# Patient Record
Sex: MALE | Race: WHITE | NOT HISPANIC OR LATINO | URBAN - METROPOLITAN AREA
[De-identification: names, ages, dates, MRNs, and addresses within clinical notes are randomized per-mention and may not be internally consistent; named-entity substitution may affect disease eponyms.]

---

## 2017-03-02 ENCOUNTER — OUTPATIENT (OUTPATIENT)
Dept: OUTPATIENT SERVICES | Facility: HOSPITAL | Age: 77
LOS: 1 days | End: 2017-03-02
Payer: MEDICARE

## 2017-03-02 PROCEDURE — 73502 X-RAY EXAM HIP UNI 2-3 VIEWS: CPT

## 2017-03-02 PROCEDURE — 73502 X-RAY EXAM HIP UNI 2-3 VIEWS: CPT | Mod: 26,LT

## 2019-01-16 ENCOUNTER — OUTPATIENT (OUTPATIENT)
Dept: OUTPATIENT SERVICES | Facility: HOSPITAL | Age: 79
LOS: 1 days | End: 2019-01-16
Payer: COMMERCIAL

## 2019-01-16 LAB
MRSA PCR RESULT.: NEGATIVE — SIGNIFICANT CHANGE UP
S AUREUS DNA NOSE QL NAA+PROBE: NEGATIVE — SIGNIFICANT CHANGE UP

## 2019-01-16 PROCEDURE — 87641 MR-STAPH DNA AMP PROBE: CPT

## 2019-01-29 ENCOUNTER — OUTPATIENT (OUTPATIENT)
Dept: OUTPATIENT SERVICES | Facility: HOSPITAL | Age: 79
LOS: 1 days | End: 2019-01-29
Payer: MEDICARE

## 2019-01-29 PROCEDURE — 73502 X-RAY EXAM HIP UNI 2-3 VIEWS: CPT | Mod: 26,LT

## 2019-01-31 VITALS
HEART RATE: 69 BPM | RESPIRATION RATE: 16 BRPM | HEIGHT: 71 IN | WEIGHT: 167.99 LBS | DIASTOLIC BLOOD PRESSURE: 74 MMHG | SYSTOLIC BLOOD PRESSURE: 123 MMHG | TEMPERATURE: 98 F | OXYGEN SATURATION: 97 %

## 2019-01-31 NOTE — H&P ADULT - NSHPLABSRESULTS_GEN_ALL_CORE
Preop CMP/CBC/PT/INR/PTT - WNL, reviewed by medical clearance  Preop UA - ordered for day of surgery   EKG - WNL reviewed by medical clearance   Nares swab - negative for MRSA/MSSA  Preop CXR - no acute pulmonary pathology; new linear metallic density in the left lung, likely an embolized radioactive prostate seed - reviewed by medical clearance Preop CMP/CBC/PT/INR/PTT - WNL, reviewed by medical clearance  Preop UA - neg  EKG - WNL reviewed by medical clearance   Nares swab - negative for MRSA/MSSA  Preop CXR - no acute pulmonary pathology; new linear metallic density in the left lung, likely an embolized radioactive prostate seed - reviewed by medical clearance

## 2019-01-31 NOTE — H&P ADULT - PMH
Hernia    Meniscus degeneration, right    Prostate cancer Broken shoulder, left, closed, initial encounter  greater tuberosity fx  Hernia    High cholesterol    HTN (hypertension)    Meniscus degeneration, right    Prostate cancer  seeds implants

## 2019-01-31 NOTE — H&P ADULT - HISTORY OF PRESENT ILLNESS
78 year old male presents with left hip pain x  Presents today for elective left total hip replacement 78 year old male presents with left hip pain worsened over time without improvement. Failed conservative treatments. Denies numbness, tingling. Ambulates without assist. Presents today for elective left THR, removal of HO, rectus femoris repair.

## 2019-01-31 NOTE — H&P ADULT - PROBLEM SELECTOR PLAN 1
Admit to Orthopaedic Service.  Presents today for elective  left total hip replacement   Pt medically stable and cleared for procedure today by Dr. Hdz Admit to Orthopaedic Service.  Presents today for elective left THR, removal of HO, rectus femoris repair.  Pt cleared for procedure today by Dr. Hdz

## 2019-01-31 NOTE — H&P ADULT - NSHPPHYSICALEXAM_GEN_ALL_CORE
MSK: decreased range of motion left hip secondary to pain  Remainder of physical exam as per medical clearance note Left LE: Hip decreased ROM secondary to pain, sensation intact, DP 2+, brisk cap refill, EHL/FHL/TA/GS 5/5  Rest of PE per MD clearance

## 2019-02-01 ENCOUNTER — INPATIENT (INPATIENT)
Facility: HOSPITAL | Age: 79
LOS: 0 days | Discharge: HOME HEALTH | DRG: 470 | End: 2019-02-02
Attending: STUDENT IN AN ORGANIZED HEALTH CARE EDUCATION/TRAINING PROGRAM | Admitting: STUDENT IN AN ORGANIZED HEALTH CARE EDUCATION/TRAINING PROGRAM
Payer: MEDICARE

## 2019-02-01 DIAGNOSIS — M16.12 UNILATERAL PRIMARY OSTEOARTHRITIS, LEFT HIP: ICD-10-CM

## 2019-02-01 DIAGNOSIS — Z41.9 ENCOUNTER FOR PROCEDURE FOR PURPOSES OTHER THAN REMEDYING HEALTH STATE, UNSPECIFIED: Chronic | ICD-10-CM

## 2019-02-01 DIAGNOSIS — S42.92XA FRACTURE OF LEFT SHOULDER GIRDLE, PART UNSPECIFIED, INITIAL ENCOUNTER FOR CLOSED FRACTURE: ICD-10-CM

## 2019-02-01 DIAGNOSIS — C61 MALIGNANT NEOPLASM OF PROSTATE: ICD-10-CM

## 2019-02-01 DIAGNOSIS — Z98.890 OTHER SPECIFIED POSTPROCEDURAL STATES: Chronic | ICD-10-CM

## 2019-02-01 DIAGNOSIS — I10 ESSENTIAL (PRIMARY) HYPERTENSION: ICD-10-CM

## 2019-02-01 DIAGNOSIS — Z90.49 ACQUIRED ABSENCE OF OTHER SPECIFIED PARTS OF DIGESTIVE TRACT: Chronic | ICD-10-CM

## 2019-02-01 LAB
APPEARANCE UR: CLEAR — SIGNIFICANT CHANGE UP
BILIRUB UR-MCNC: NEGATIVE — SIGNIFICANT CHANGE UP
COLOR SPEC: YELLOW — SIGNIFICANT CHANGE UP
DIFF PNL FLD: NEGATIVE — SIGNIFICANT CHANGE UP
GLUCOSE UR QL: NEGATIVE — SIGNIFICANT CHANGE UP
KETONES UR-MCNC: NEGATIVE — SIGNIFICANT CHANGE UP
LEUKOCYTE ESTERASE UR-ACNC: NEGATIVE — SIGNIFICANT CHANGE UP
NITRITE UR-MCNC: NEGATIVE — SIGNIFICANT CHANGE UP
PH UR: 5.5 — SIGNIFICANT CHANGE UP (ref 5–8)
PROT UR-MCNC: NEGATIVE MG/DL — SIGNIFICANT CHANGE UP
SP GR SPEC: >=1.03 — SIGNIFICANT CHANGE UP (ref 1–1.03)
UROBILINOGEN FLD QL: 0.2 E.U./DL — SIGNIFICANT CHANGE UP

## 2019-02-01 PROCEDURE — 72170 X-RAY EXAM OF PELVIS: CPT | Mod: 26

## 2019-02-01 RX ORDER — ONDANSETRON 8 MG/1
4 TABLET, FILM COATED ORAL EVERY 6 HOURS
Qty: 0 | Refills: 0 | Status: DISCONTINUED | OUTPATIENT
Start: 2019-02-01 | End: 2019-02-02

## 2019-02-01 RX ORDER — OXYCODONE HYDROCHLORIDE 5 MG/1
20 TABLET ORAL ONCE
Qty: 0 | Refills: 0 | Status: DISCONTINUED | OUTPATIENT
Start: 2019-02-01 | End: 2019-02-01

## 2019-02-01 RX ORDER — DOCUSATE SODIUM 100 MG
100 CAPSULE ORAL THREE TIMES A DAY
Qty: 0 | Refills: 0 | Status: DISCONTINUED | OUTPATIENT
Start: 2019-02-01 | End: 2019-02-02

## 2019-02-01 RX ORDER — CEFAZOLIN SODIUM 1 G
2000 VIAL (EA) INJECTION EVERY 8 HOURS
Qty: 0 | Refills: 0 | Status: COMPLETED | OUTPATIENT
Start: 2019-02-01 | End: 2019-02-02

## 2019-02-01 RX ORDER — INDOMETHACIN 50 MG
75 CAPSULE ORAL
Qty: 0 | Refills: 0 | Status: DISCONTINUED | OUTPATIENT
Start: 2019-02-01 | End: 2019-02-02

## 2019-02-01 RX ORDER — HYDROMORPHONE HYDROCHLORIDE 2 MG/ML
0.5 INJECTION INTRAMUSCULAR; INTRAVENOUS; SUBCUTANEOUS
Qty: 0 | Refills: 0 | Status: DISCONTINUED | OUTPATIENT
Start: 2019-02-01 | End: 2019-02-02

## 2019-02-01 RX ORDER — CELECOXIB 200 MG/1
200 CAPSULE ORAL DAILY
Qty: 0 | Refills: 0 | Status: DISCONTINUED | OUTPATIENT
Start: 2019-02-01 | End: 2019-02-02

## 2019-02-01 RX ORDER — MAGNESIUM HYDROXIDE 400 MG/1
30 TABLET, CHEWABLE ORAL AT BEDTIME
Qty: 0 | Refills: 0 | Status: DISCONTINUED | OUTPATIENT
Start: 2019-02-01 | End: 2019-02-02

## 2019-02-01 RX ORDER — ASPIRIN/CALCIUM CARB/MAGNESIUM 324 MG
325 TABLET ORAL
Qty: 0 | Refills: 0 | Status: DISCONTINUED | OUTPATIENT
Start: 2019-02-01 | End: 2019-02-02

## 2019-02-01 RX ORDER — POLYETHYLENE GLYCOL 3350 17 G/17G
17 POWDER, FOR SOLUTION ORAL DAILY
Qty: 0 | Refills: 0 | Status: DISCONTINUED | OUTPATIENT
Start: 2019-02-01 | End: 2019-02-02

## 2019-02-01 RX ORDER — BUPIVACAINE 13.3 MG/ML
20 INJECTION, SUSPENSION, LIPOSOMAL INFILTRATION ONCE
Qty: 0 | Refills: 0 | Status: DISCONTINUED | OUTPATIENT
Start: 2019-02-01 | End: 2019-02-02

## 2019-02-01 RX ORDER — SODIUM CHLORIDE 9 MG/ML
1000 INJECTION, SOLUTION INTRAVENOUS
Qty: 0 | Refills: 0 | Status: DISCONTINUED | OUTPATIENT
Start: 2019-02-01 | End: 2019-02-02

## 2019-02-01 RX ORDER — OXYCODONE HYDROCHLORIDE 5 MG/1
10 TABLET ORAL EVERY 4 HOURS
Qty: 0 | Refills: 0 | Status: DISCONTINUED | OUTPATIENT
Start: 2019-02-01 | End: 2019-02-02

## 2019-02-01 RX ORDER — SENNA PLUS 8.6 MG/1
2 TABLET ORAL AT BEDTIME
Qty: 0 | Refills: 0 | Status: DISCONTINUED | OUTPATIENT
Start: 2019-02-01 | End: 2019-02-02

## 2019-02-01 RX ORDER — ATORVASTATIN CALCIUM 80 MG/1
10 TABLET, FILM COATED ORAL AT BEDTIME
Qty: 0 | Refills: 0 | Status: DISCONTINUED | OUTPATIENT
Start: 2019-02-01 | End: 2019-02-02

## 2019-02-01 RX ORDER — CEFAZOLIN SODIUM 1 G
2000 VIAL (EA) INJECTION EVERY 8 HOURS
Qty: 0 | Refills: 0 | Status: DISCONTINUED | OUTPATIENT
Start: 2019-02-01 | End: 2019-02-01

## 2019-02-01 RX ORDER — ACETAMINOPHEN 500 MG
650 TABLET ORAL EVERY 6 HOURS
Qty: 0 | Refills: 0 | Status: DISCONTINUED | OUTPATIENT
Start: 2019-02-01 | End: 2019-02-02

## 2019-02-01 RX ORDER — HYDROMORPHONE HYDROCHLORIDE 2 MG/ML
0.5 INJECTION INTRAMUSCULAR; INTRAVENOUS; SUBCUTANEOUS EVERY 4 HOURS
Qty: 0 | Refills: 0 | Status: DISCONTINUED | OUTPATIENT
Start: 2019-02-01 | End: 2019-02-02

## 2019-02-01 RX ORDER — CELECOXIB 200 MG/1
400 CAPSULE ORAL ONCE
Qty: 0 | Refills: 0 | Status: COMPLETED | OUTPATIENT
Start: 2019-02-01 | End: 2019-02-01

## 2019-02-01 RX ORDER — METOPROLOL TARTRATE 50 MG
25 TABLET ORAL DAILY
Qty: 0 | Refills: 0 | Status: DISCONTINUED | OUTPATIENT
Start: 2019-02-01 | End: 2019-02-02

## 2019-02-01 RX ORDER — OXYCODONE HYDROCHLORIDE 5 MG/1
5 TABLET ORAL EVERY 4 HOURS
Qty: 0 | Refills: 0 | Status: DISCONTINUED | OUTPATIENT
Start: 2019-02-01 | End: 2019-02-02

## 2019-02-01 RX ADMIN — Medication 650 MILLIGRAM(S): at 17:53

## 2019-02-01 RX ADMIN — ATORVASTATIN CALCIUM 10 MILLIGRAM(S): 80 TABLET, FILM COATED ORAL at 21:25

## 2019-02-01 RX ADMIN — Medication 650 MILLIGRAM(S): at 18:24

## 2019-02-01 RX ADMIN — OXYCODONE HYDROCHLORIDE 20 MILLIGRAM(S): 5 TABLET ORAL at 07:24

## 2019-02-01 RX ADMIN — Medication 2000 MILLIGRAM(S): at 16:50

## 2019-02-01 RX ADMIN — CELECOXIB 400 MILLIGRAM(S): 200 CAPSULE ORAL at 07:24

## 2019-02-01 RX ADMIN — HYDROMORPHONE HYDROCHLORIDE 0.5 MILLIGRAM(S): 2 INJECTION INTRAMUSCULAR; INTRAVENOUS; SUBCUTANEOUS at 15:29

## 2019-02-01 RX ADMIN — SODIUM CHLORIDE 125 MILLILITER(S): 9 INJECTION, SOLUTION INTRAVENOUS at 16:50

## 2019-02-01 RX ADMIN — Medication 75 MILLIGRAM(S): at 17:53

## 2019-02-01 RX ADMIN — Medication 100 MILLIGRAM(S): at 21:25

## 2019-02-01 RX ADMIN — Medication 75 MILLIGRAM(S): at 18:24

## 2019-02-01 RX ADMIN — Medication 325 MILLIGRAM(S): at 17:52

## 2019-02-01 RX ADMIN — HYDROMORPHONE HYDROCHLORIDE 0.5 MILLIGRAM(S): 2 INJECTION INTRAMUSCULAR; INTRAVENOUS; SUBCUTANEOUS at 13:57

## 2019-02-01 NOTE — BRIEF OPERATIVE NOTE - PRE-OP DX
Primary osteoarthritis of left hip  02/01/2019  AND HETEROTROPHIC OSSIFICATION LEFT HIP  Active  Mike Mckeon

## 2019-02-01 NOTE — PHYSICAL THERAPY INITIAL EVALUATION ADULT - ADDITIONAL COMMENTS
Pt ind prior to surgery. 2 steps into lobby to elevator. Denies owning any DME. Pt's wife has an aide.

## 2019-02-01 NOTE — PROGRESS NOTE ADULT - SUBJECTIVE AND OBJECTIVE BOX
Orthopedics Post Op Check    Procedure: LEFT THR, REMOVAL OF HO, RECTUS FEMORIS REPAIR  Surgeon: JENNIFER    Pt. stable, laying in bed with muffin in hand. Pt. states he is hungry. Pt. advised not to eat whole foods yet, will advance diet later.   Denies CP, SOB, N/V, numbness/tingling in b/l les.     AVSS  Dressing C/D/I adrianna dressing     Pulses: DP/PT 2+ B/L LES  SLT: intact  B/L LES  Motor:  EHL/FHL/TA/GS  5/5 b/l les          Post op XR: Prosthesis in place    A/P: 78yoMale POD#0 s/p LEFT THR, REMOVAL OF HO, RECTUS FEMORIS REPAIR  - Stable  - Pain Control  - DVT ppx:asa  - Post op abx: ancef  - PT, WBS: wbat b/l les  - F/U AM Labs

## 2019-02-01 NOTE — PROGRESS NOTE ADULT - SUBJECTIVE AND OBJECTIVE BOX
SUBJECTIVE: Patient seen and examined. Pt doing well o/n.  No f/c/n/v/cp/sob.     OBJECTIVE:  Vital Signs Last 24 Hrs  T(C): 36.6 (01 Feb 2019 16:31), Max: 36.6 (01 Feb 2019 16:31)  T(F): 97.8 (01 Feb 2019 16:31), Max: 97.8 (01 Feb 2019 16:31)  HR: 80 (01 Feb 2019 16:31) (70 - 84)  BP: 104/64 (01 Feb 2019 16:31) (104/64 - 128/68)  BP(mean): 88 (01 Feb 2019 15:29) (80 - 93)  RR: 17 (01 Feb 2019 16:31) (10 - 24)  SpO2: 98% (01 Feb 2019 16:31) (94% - 100%)    Affected extremity:          Dressing: clean/dry/intact            Sensation: SILT         Motor exam: 5/5 TA/GS/EHL         warm well perfused; capillary refill <3 seconds     LABS:            Urinalysis Basic - ( 01 Feb 2019 06:51 )    Color: Yellow / Appearance: Clear / SG: >=1.030 / pH: x  Gluc: x / Ketone: NEGATIVE  / Bili: Negative / Urobili: 0.2 E.U./dL   Blood: x / Protein: NEGATIVE mg/dL / Nitrite: NEGATIVE   Leuk Esterase: NEGATIVE / RBC: x / WBC x   Sq Epi: x / Non Sq Epi: x / Bacteria: x      MEDICATIONS:acetaminophen   Tablet .. 650 milliGRAM(s) Oral every 6 hours  aluminum hydroxide/magnesium hydroxide/simethicone Suspension 30 milliLiter(s) Oral four times a day PRN  aspirin enteric coated 325 milliGRAM(s) Oral two times a day  atorvastatin 10 milliGRAM(s) Oral at bedtime  BUpivacaine liposome 1.3% Injectable (no eMAR) 20 milliLiter(s) Local Injection once  ceFAZolin  Injectable. 2000 milliGRAM(s) IV Push every 8 hours  celecoxib 200 milliGRAM(s) Oral daily  docusate sodium 100 milliGRAM(s) Oral three times a day  HYDROmorphone  Injectable 0.5 milliGRAM(s) IV Push every 15 minutes PRN  HYDROmorphone  Injectable 0.5 milliGRAM(s) IV Push every 4 hours PRN  indomethacin 75 milliGRAM(s) Oral two times a day  lactated ringers. 1000 milliLiter(s) IV Continuous <Continuous>  magnesium hydroxide Suspension 30 milliLiter(s) Oral at bedtime PRN  metoprolol tartrate 25 milliGRAM(s) Oral daily  ondansetron Injectable 4 milliGRAM(s) IV Push every 6 hours PRN  oxyCODONE    IR 5 milliGRAM(s) Oral every 4 hours PRN  oxyCODONE    IR 10 milliGRAM(s) Oral every 4 hours PRN  polyethylene glycol 3350 17 Gram(s) Oral daily  senna 2 Tablet(s) Oral at bedtime PRN    Anticoagulation:  aspirin enteric coated 325 milliGRAM(s) Oral two times a day    Antibiotics:   ceFAZolin  Injectable. 2000 milliGRAM(s) IV Push every 8 hours    Pain medications:   acetaminophen   Tablet .. 650 milliGRAM(s) Oral every 6 hours  celecoxib 200 milliGRAM(s) Oral daily  HYDROmorphone  Injectable 0.5 milliGRAM(s) IV Push every 15 minutes PRN  HYDROmorphone  Injectable 0.5 milliGRAM(s) IV Push every 4 hours PRN  indomethacin 75 milliGRAM(s) Oral two times a day  ondansetron Injectable 4 milliGRAM(s) IV Push every 6 hours PRN  oxyCODONE    IR 5 milliGRAM(s) Oral every 4 hours PRN  oxyCODONE    IR 10 milliGRAM(s) Oral every 4 hours PRN    A/P :  Pt is a 77yo Male s/p  POD # Left ROSAMARIA  -    Pain control  -    DVT ppx: ASA     -    Weight bearing status: WBAT , No hyperextension, NO ER more than 20 degrees  -    Physical Therapy  -    Dispo: Home

## 2019-02-01 NOTE — BRIEF OPERATIVE NOTE - PROCEDURE
<<-----Click on this checkbox to enter Procedure Total hip arthroplasty  02/01/2019  LEFT THR, REMOVAL OF H.O., REPAIR OF RECTUS FEMORIS  Active  KMATHIS

## 2019-02-01 NOTE — PROGRESS NOTE ADULT - ASSESSMENT
A/P :  Pt is a 77yo Male s/p  POD # Left ROSAMARIA  -    Pain control  -    DVT ppx: ASA  , Indomethacine for HO prophylaxis   -    Weight bearing status: WBAT , No hyperextension, NO ER more than 20 degrees  -    Physical Therapy  -    Dispo: Home

## 2019-02-02 ENCOUNTER — TRANSCRIPTION ENCOUNTER (OUTPATIENT)
Age: 79
End: 2019-02-02

## 2019-02-02 VITALS
TEMPERATURE: 97 F | HEART RATE: 71 BPM | SYSTOLIC BLOOD PRESSURE: 104 MMHG | OXYGEN SATURATION: 98 % | RESPIRATION RATE: 16 BRPM | DIASTOLIC BLOOD PRESSURE: 57 MMHG

## 2019-02-02 LAB
ANION GAP SERPL CALC-SCNC: 12 MMOL/L — SIGNIFICANT CHANGE UP (ref 5–17)
BUN SERPL-MCNC: 25 MG/DL — HIGH (ref 7–23)
CALCIUM SERPL-MCNC: 8.9 MG/DL — SIGNIFICANT CHANGE UP (ref 8.4–10.5)
CHLORIDE SERPL-SCNC: 105 MMOL/L — SIGNIFICANT CHANGE UP (ref 96–108)
CO2 SERPL-SCNC: 24 MMOL/L — SIGNIFICANT CHANGE UP (ref 22–31)
CREAT SERPL-MCNC: 1.01 MG/DL — SIGNIFICANT CHANGE UP (ref 0.5–1.3)
GLUCOSE SERPL-MCNC: 150 MG/DL — HIGH (ref 70–99)
HCT VFR BLD CALC: 30.9 % — LOW (ref 39–50)
HGB BLD-MCNC: 10 G/DL — LOW (ref 13–17)
MCHC RBC-ENTMCNC: 29.9 PG — SIGNIFICANT CHANGE UP (ref 27–34)
MCHC RBC-ENTMCNC: 32.4 G/DL — SIGNIFICANT CHANGE UP (ref 32–36)
MCV RBC AUTO: 92.5 FL — SIGNIFICANT CHANGE UP (ref 80–100)
PLATELET # BLD AUTO: 177 K/UL — SIGNIFICANT CHANGE UP (ref 150–400)
POTASSIUM SERPL-MCNC: 4.3 MMOL/L — SIGNIFICANT CHANGE UP (ref 3.5–5.3)
POTASSIUM SERPL-SCNC: 4.3 MMOL/L — SIGNIFICANT CHANGE UP (ref 3.5–5.3)
RBC # BLD: 3.34 M/UL — LOW (ref 4.2–5.8)
RBC # FLD: 13 % — SIGNIFICANT CHANGE UP (ref 10.3–16.9)
SODIUM SERPL-SCNC: 141 MMOL/L — SIGNIFICANT CHANGE UP (ref 135–145)
WBC # BLD: 10 K/UL — SIGNIFICANT CHANGE UP (ref 3.8–10.5)
WBC # FLD AUTO: 10 K/UL — SIGNIFICANT CHANGE UP (ref 3.8–10.5)

## 2019-02-02 PROCEDURE — 99231 SBSQ HOSP IP/OBS SF/LOW 25: CPT

## 2019-02-02 RX ORDER — METOPROLOL TARTRATE 50 MG
25 TABLET ORAL DAILY
Qty: 0 | Refills: 0 | Status: DISCONTINUED | OUTPATIENT
Start: 2019-02-02 | End: 2019-02-02

## 2019-02-02 RX ORDER — INDOMETHACIN 50 MG
75 CAPSULE ORAL
Qty: 2250 | Refills: 0
Start: 2019-02-02 | End: 2019-02-16

## 2019-02-02 RX ORDER — SENNA PLUS 8.6 MG/1
2 TABLET ORAL
Qty: 0 | Refills: 0 | DISCHARGE
Start: 2019-02-02

## 2019-02-02 RX ORDER — DOCUSATE SODIUM 100 MG
1 CAPSULE ORAL
Qty: 0 | Refills: 0 | DISCHARGE
Start: 2019-02-02

## 2019-02-02 RX ORDER — ASPIRIN/CALCIUM CARB/MAGNESIUM 324 MG
325 TABLET ORAL
Qty: 30 | Refills: 0
Start: 2019-02-02 | End: 2019-02-16

## 2019-02-02 RX ADMIN — Medication 325 MILLIGRAM(S): at 06:43

## 2019-02-02 RX ADMIN — Medication 100 MILLIGRAM(S): at 06:43

## 2019-02-02 RX ADMIN — Medication 650 MILLIGRAM(S): at 12:00

## 2019-02-02 RX ADMIN — Medication 75 MILLIGRAM(S): at 06:43

## 2019-02-02 RX ADMIN — Medication 650 MILLIGRAM(S): at 00:36

## 2019-02-02 RX ADMIN — SODIUM CHLORIDE 125 MILLILITER(S): 9 INJECTION, SOLUTION INTRAVENOUS at 06:43

## 2019-02-02 RX ADMIN — Medication 650 MILLIGRAM(S): at 11:30

## 2019-02-02 RX ADMIN — Medication 650 MILLIGRAM(S): at 06:43

## 2019-02-02 RX ADMIN — Medication 2000 MILLIGRAM(S): at 00:36

## 2019-02-02 NOTE — DISCHARGE NOTE ADULT - ADDITIONAL INSTRUCTIONS
Please follow up with Dr. Guerrier in 1 week or as scheduled   Please call and schedule an appointment with your primary care physician in 1 week

## 2019-02-02 NOTE — PROGRESS NOTE ADULT - ASSESSMENT
A/P :  Pt is a 77yo Male s/p  POD # 1 Left ROSAMARIA  -    Pain control  -    DVT ppx: ASA     -    Weight bearing status: WBAT, No hyperexetension, No ER more than 20 degrees  -    Physical Therapy  -    Dispo: Home

## 2019-02-02 NOTE — PROGRESS NOTE ADULT - SUBJECTIVE AND OBJECTIVE BOX
SUBJECTIVE: Patient seen and examined. Pt doing well o/n.  No f/c/n/v/cp/sob.     Left ROSAMARIA POD#1    OBJECTIVE:  Vital Signs Last 24 Hrs  T(C): 35.9 (02 Feb 2019 08:30), Max: 36.8 (01 Feb 2019 20:48)  T(F): 96.7 (02 Feb 2019 08:30), Max: 98.2 (01 Feb 2019 20:48)  HR: 71 (02 Feb 2019 08:30) (71 - 98)  BP: 104/57 (02 Feb 2019 08:30) (96/58 - 121/70)  BP(mean): 88 (01 Feb 2019 15:29) (88 - 88)  RR: 16 (02 Feb 2019 08:30) (12 - 17)  SpO2: 98% (02 Feb 2019 08:30) (96% - 100%)    Affected extremity:          Dressing: clean/dry/intact            Sensation: SILT         Motor exam: 5/5 TA/GS/EHL         warm well perfused; capillary refill <3 seconds     LABS:                        10.0   10.0  )-----------( 177      ( 02 Feb 2019 06:53 )             30.9     02-02    141  |  105  |  25<H>  ----------------------------<  150<H>  4.3   |  24  |  1.01    Ca    8.9      02 Feb 2019 06:53        Urinalysis Basic - ( 01 Feb 2019 06:51 )    Color: Yellow / Appearance: Clear / SG: >=1.030 / pH: x  Gluc: x / Ketone: NEGATIVE  / Bili: Negative / Urobili: 0.2 E.U./dL   Blood: x / Protein: NEGATIVE mg/dL / Nitrite: NEGATIVE   Leuk Esterase: NEGATIVE / RBC: x / WBC x   Sq Epi: x / Non Sq Epi: x / Bacteria: x      MEDICATIONS:acetaminophen   Tablet .. 650 milliGRAM(s) Oral every 6 hours  aluminum hydroxide/magnesium hydroxide/simethicone Suspension 30 milliLiter(s) Oral four times a day PRN  aspirin enteric coated 325 milliGRAM(s) Oral two times a day  atorvastatin 10 milliGRAM(s) Oral at bedtime  BUpivacaine liposome 1.3% Injectable (no eMAR) 20 milliLiter(s) Local Injection once  celecoxib 200 milliGRAM(s) Oral daily  docusate sodium 100 milliGRAM(s) Oral three times a day  HYDROmorphone  Injectable 0.5 milliGRAM(s) IV Push every 15 minutes PRN  HYDROmorphone  Injectable 0.5 milliGRAM(s) IV Push every 4 hours PRN  indomethacin 75 milliGRAM(s) Oral two times a day  lactated ringers. 1000 milliLiter(s) IV Continuous <Continuous>  magnesium hydroxide Suspension 30 milliLiter(s) Oral at bedtime PRN  metoprolol succinate ER 25 milliGRAM(s) Oral daily  ondansetron Injectable 4 milliGRAM(s) IV Push every 6 hours PRN  oxyCODONE    IR 5 milliGRAM(s) Oral every 4 hours PRN  oxyCODONE    IR 10 milliGRAM(s) Oral every 4 hours PRN  polyethylene glycol 3350 17 Gram(s) Oral daily  senna 2 Tablet(s) Oral at bedtime PRN    Anticoagulation:  aspirin enteric coated 325 milliGRAM(s) Oral two times a day    Antibiotics:     Pain medications:   acetaminophen   Tablet .. 650 milliGRAM(s) Oral every 6 hours  celecoxib 200 milliGRAM(s) Oral daily  HYDROmorphone  Injectable 0.5 milliGRAM(s) IV Push every 15 minutes PRN  HYDROmorphone  Injectable 0.5 milliGRAM(s) IV Push every 4 hours PRN  indomethacin 75 milliGRAM(s) Oral two times a day  ondansetron Injectable 4 milliGRAM(s) IV Push every 6 hours PRN  oxyCODONE    IR 5 milliGRAM(s) Oral every 4 hours PRN  oxyCODONE    IR 10 milliGRAM(s) Oral every 4 hours PRN    A/P :  Pt is a 79yo Male s/p  POD # 1 Left ROSAMARIA  -    Pain control  -    DVT ppx: ASA     -    Weight bearing status: WBAT, No hyperexetension, No ER more than 20 degrees  -    Physical Therapy  -    Dispo: Home

## 2019-02-02 NOTE — DISCHARGE NOTE ADULT - MEDICATION SUMMARY - MEDICATIONS TO TAKE
I will START or STAY ON the medications listed below when I get home from the hospital:    Aspir 81 oral delayed release tablet  -- 1 tab(s) by mouth once a day  -- Indication: For HTN (hypertension)    Indocin SR 75 mg oral capsule, extended release  -- 75 cap(s) by mouth 2 times a day MDD:Take 1 tab every 12 hours per day for pain   -- Do not take aspirin or aspirin containing products without knowledge and consent of your physician.  It is very important that you take or use this exactly as directed.  Do not skip doses or discontinue unless directed by your doctor.  May cause drowsiness or dizziness.  Obtain medical advice before taking any non-prescription drugs as some may affect the action of this medication.  Swallow whole.  Do not crush.  Take with food or milk.    -- Indication: For Primary osteoarthritis of left hip    aspirin 325 mg oral tablet  -- 325 tab(s) by mouth 2 times a day MDD:Take 1 tab every 12 hours  -- Take with food or milk.    -- Indication: For Primary osteoarthritis of left hip    oxycodone-acetaminophen 5 mg-325 mg oral tablet  -- 5 tab(s) by mouth every 6 hours MDD:Take 1-2 tabs every 6 hours for pain as needed  -- Caution federal law prohibits the transfer of this drug to any person other  than the person for whom it was prescribed.  May cause drowsiness.  Alcohol may intensify this effect.  Use care when operating dangerous machinery.  This prescription cannot be refilled.  This product contains acetaminophen.  Do not use  with any other product containing acetaminophen to prevent possible liver damage.  Using more of this medication than prescribed may cause serious breathing problems.    -- Indication: For Primary osteoarthritis of left hip    atorvastatin 10 mg oral tablet  -- 1 tab(s) by mouth once a day  -- Indication: For High cholesterol    metoprolol tartrate 25 mg oral tablet  -- 1 tab(s) by mouth once a day  -- Indication: For HTN (hypertension)    senna oral tablet  -- 2 tab(s) by mouth once a day (at bedtime), As needed, Constipation  -- Indication: For Primary osteoarthritis of left hip    docusate sodium 100 mg oral capsule  -- 1 cap(s) by mouth 3 times a day  -- Indication: For Primary osteoarthritis of left hip

## 2019-02-02 NOTE — DISCHARGE NOTE ADULT - NSFTFATTENDCERTRD_GEN_ALL_CORE
Home My signature below certifies that the above stated patient is homebound and upon completion of the Face-To-Face encounter, has the need for intermittent skilled nursing, physical therapy and/or speech or occupational therapy services in their home for their current diagnosis as outlined in their initial plan of care. These services will continue to be monitored by myself or another physician.

## 2019-02-02 NOTE — DISCHARGE NOTE ADULT - DURABLE MEDICAL EQUIPMENT AGENCY
[facility] [place] Walker to bedside and their phone number is [phone]

## 2019-02-02 NOTE — DISCHARGE NOTE ADULT - CARE PROVIDER_API CALL
Sanjiv Guerrier; MBBS)  Orthopaedic Surgery  [address] [place] [address]  Phone: [phone]  Fax: [phone]

## 2019-02-02 NOTE — CONSULT NOTE ADULT - SUBJECTIVE AND OBJECTIVE BOX
77 yo male with hx HTN, HPL, L hip pain for elective THR.  No complaints, POD 1    Vital Signs Last 24 Hrs  T(C): 35.9 (02 Feb 2019 08:30), Max: 36.8 (01 Feb 2019 20:48)  T(F): 96.7 (02 Feb 2019 08:30), Max: 98.2 (01 Feb 2019 20:48)  HR: 71 (02 Feb 2019 08:30) (71 - 98)  BP: 104/57 (02 Feb 2019 08:30) (96/58 - 121/70)  BP(mean): 88 (01 Feb 2019 15:29) (85 - 88)  RR: 16 (02 Feb 2019 08:30) (10 - 17)  SpO2: 98% (02 Feb 2019 08:30) (96% - 100%)    AA and O x3 NAD  NCAT  neck supple  s1s2 RRR  lungs CTA b/l  abd soft NTND  + BS x 4 quadrants  ext no edema  no focal deficits      Complete Blood Count (02.02.19 @ 06:53)    WBC Count: 10.0 K/uL    RBC Count: 3.34 M/uL    Hemoglobin: 10.0 g/dL    Hematocrit: 30.9 %    Mean Cell Volume: 92.5 fL    Mean Cell Hemoglobin: 29.9 pg    Mean Cell Hemoglobin Conc: 32.4 g/dL    Red Cell Distrib Width: 13.0 %    Platelet Count - Automated: 177 K/uL    Basic Metabolic Panel (02.02.19 @ 06:53)    Sodium, Serum: 141 mmol/L    Potassium, Serum: 4.3 mmol/L    Chloride, Serum: 105 mmol/L    Carbon Dioxide, Serum: 24 mmol/L    Anion Gap, Serum: 12 mmol/L    Blood Urea Nitrogen, Serum: 25 mg/dL    Creatinine, Serum: 1.01 mg/dL    Glucose, Serum: 150 mg/dL    Calcium, Total Serum: 8.9 mg/dL    eGFR if Non : 70: The units for eGFR are ml/min/1.73m2 (normalized body surface area). The  eGFR is calculated from a serum creatinine using the CKD-EPI equation.  Other variables required for calculation are race, age and sex. Among  patients with chronic kidney disease (CKD), the eGFR is useful in  determining the stage of disease according to KDOQI CKD classification.  All eGFR results are reported numerically with the following  interpretation.          GFR                    With                        Without     (ml/min/1.73 m2)    Kidney Damage       Kidney Damage        >= 90                    Stage 1                     Normal        60-89                    Stage 2                     Decreased GFR        30-59                    Stage 3         Stage 3        15-29                    Stage 4                     Stage 4        < 15                      Stage 5                     Stage 5  Each stage of CKD assumes that the associated GFR level has been in  effect for at least 3 months. Determination of stages one and two (with  eGFR > 59 ml/min/m2) requires estimation of kidney damage for at least 3  months as defined by structural or functional abnormalities.  Limitations: All estimates of GFR will be less accurate for patientsat  extremes of muscle mass (including but not limited to frail elderly,  critically ill, or cancer patients), those with unusual diets, and those  with conditions associated with reduced secretion or extrarenal  elimination of creatinine. The eGFR equation is not recommended for use  in patients with unstable creatinine levels. mL/min/1.73M2    eGFR if : 82: The units for eGFR are ml/min/1.73m2 (normalized body surface area). The  eGFR is calculated from a serum creatinine using the CKD-EPI equation.  Other variables required for calculation are race, age and sex. Among  patients with chronic kidney disease (CKD), the eGFR is useful in  determining the stage of disease according to KDOQI CKD classification.  All eGFR results are reported numerically with the following  interpretation.          GFR                    With                        Without     (ml/min/1.73 m2)    Kidney Damage       Kidney Damage        >= 90                    Stage 1                     Normal        60-89                    Stage 2                     Decreased GFR        30-59                    Stage 3         Stage 3        15-29                    Stage 4                     Stage 4        < 15                      Stage 5                     Stage 5  Each stage of CKD assumes that the associated GFR level has been in  effect for at least 3 months. Determination of stages one and two (with  eGFR > 59 ml/min/m2) requires estimation of kidney damage for at least 3  months as defined by structural or functional abnormalities.  Limitations: All estimates of GFR will be less accurate for patientsat  extremes of muscle mass (including but not limited to frail elderly,  critically ill, or cancer patients), those with unusual diets, and those  with conditions associated with reduced secretion or extrarenal  elimination of creatinine. The eGFR equation is not recommended for use  in patients with unstable creatinine levels. mL/min/1.73M2

## 2019-02-02 NOTE — DISCHARGE NOTE ADULT - NS AS ACTIVITY OBS
Do not drive or operate machinery/Bathing allowed/No Heavy lifting/straining/Do not make important decisions

## 2019-02-02 NOTE — DISCHARGE NOTE ADULT - CARE PLAN
Principal Discharge DX:	Primary osteoarthritis of left hip  Goal:	Improved  Assessment and plan of treatment:	Pain controlled and able to ambulate

## 2019-02-02 NOTE — PROGRESS NOTE ADULT - SUBJECTIVE AND OBJECTIVE BOX
S: Patient seen and examined. Pt. doing well, Pain endorsed but controlled. No acute events overnight.    Vital Signs Last 24 Hrs  T(C): 35.9 (02 Feb 2019 08:30), Max: 36.8 (01 Feb 2019 20:48)  T(F): 96.7 (02 Feb 2019 08:30), Max: 98.2 (01 Feb 2019 20:48)  HR: 71 (02 Feb 2019 08:30) (70 - 98)  BP: 104/57 (02 Feb 2019 08:30) (96/58 - 128/68)  BP(mean): 88 (01 Feb 2019 15:29) (80 - 93)  RR: 16 (02 Feb 2019 08:30) (10 - 24)  SpO2: 98% (02 Feb 2019 08:30) (94% - 100%)    NAD, AOx3, comfortable  Motor: 5/5 GS/TA/EHL/FHL    Sensation: SILT   Pulses: WWP, DP/PT 2+    Dressing: C/D/I                          10.0   10.0  )-----------( 177      ( 02 Feb 2019 06:53 )             30.9         A/P:  78y Male s/p L ROSAMARIA on 2/1          -Stable  -Pain Control  -PT/WBAT  -DVT ppx: ASA  -Advance diet as tolerated  -f/u AM labs  -Dispo: Home

## 2019-02-02 NOTE — DISCHARGE NOTE ADULT - PATIENT PORTAL LINK FT
You can access the [facility] Patient Portal, offered by [facility], by registering with the following website: [email or web address]

## 2019-02-02 NOTE — CONSULT NOTE ADULT - ASSESSMENT
77 yo male POD 1 THR    1) post op state  pain control  OOB-C  PT  bowel regimen    2) HTN - stable  - c/w metoprolol    3) HPL  stable, c/w statin

## 2019-02-02 NOTE — DISCHARGE NOTE ADULT - DISCHARGE TO
[facility] Start of Care Requested day after discharge for RN evaluation, Home physical therapy with [facility] at home. Please call agency upon arrival home between 10am and 3pm to confirm time of nurse visit./Home with Home Care

## 2019-02-06 DIAGNOSIS — M16.12 UNILATERAL PRIMARY OSTEOARTHRITIS, LEFT HIP: ICD-10-CM

## 2019-02-06 DIAGNOSIS — Z79.899 OTHER LONG TERM (CURRENT) DRUG THERAPY: ICD-10-CM

## 2019-02-06 DIAGNOSIS — I10 ESSENTIAL (PRIMARY) HYPERTENSION: ICD-10-CM

## 2019-02-06 DIAGNOSIS — S76.012A STRAIN OF MUSCLE, FASCIA AND TENDON OF LEFT HIP, INITIAL ENCOUNTER: ICD-10-CM

## 2019-02-06 DIAGNOSIS — M24.052 LOOSE BODY IN LEFT HIP: ICD-10-CM

## 2019-02-06 DIAGNOSIS — S42.92XD FRACTURE OF LEFT SHOULDER GIRDLE, PART UNSPECIFIED, SUBSEQUENT ENCOUNTER FOR FRACTURE WITH ROUTINE HEALING: ICD-10-CM

## 2019-02-06 DIAGNOSIS — Z90.49 ACQUIRED ABSENCE OF OTHER SPECIFIED PARTS OF DIGESTIVE TRACT: ICD-10-CM
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
## 2019-02-06 DIAGNOSIS — Z98.890 OTHER SPECIFIED POSTPROCEDURAL STATES: ICD-10-CM

## 2019-02-06 DIAGNOSIS — Z92.3 PERSONAL HISTORY OF IRRADIATION: ICD-10-CM

## 2019-02-06 DIAGNOSIS — E78.00 PURE HYPERCHOLESTEROLEMIA, UNSPECIFIED: ICD-10-CM

## 2019-02-06 DIAGNOSIS — Z28.89 IMMUNIZATION NOT CARRIED OUT FOR OTHER REASON: ICD-10-CM

## 2019-02-06 DIAGNOSIS — Z79.82 LONG TERM (CURRENT) USE OF ASPIRIN: ICD-10-CM

## 2019-02-06 DIAGNOSIS — Z85.46 PERSONAL HISTORY OF MALIGNANT NEOPLASM OF PROSTATE: ICD-10-CM

## 2019-02-06 DIAGNOSIS — Z96.0 PRESENCE OF UROGENITAL IMPLANTS: ICD-10-CM
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
## 2019-02-06 DIAGNOSIS — Z88.0 ALLERGY STATUS TO PENICILLIN: ICD-10-CM

## 2019-02-09 LAB — SURGICAL PATHOLOGY STUDY: SIGNIFICANT CHANGE UP

## 2019-02-26 PROCEDURE — 85027 COMPLETE CBC AUTOMATED: CPT

## 2019-02-26 PROCEDURE — 81003 URINALYSIS AUTO W/O SCOPE: CPT

## 2019-02-26 PROCEDURE — 76000 FLUOROSCOPY <1 HR PHYS/QHP: CPT

## 2019-02-26 PROCEDURE — 97161 PT EVAL LOW COMPLEX 20 MIN: CPT

## 2019-02-26 PROCEDURE — 73502 X-RAY EXAM HIP UNI 2-3 VIEWS: CPT

## 2019-02-26 PROCEDURE — 86900 BLOOD TYPING SEROLOGIC ABO: CPT

## 2019-02-26 PROCEDURE — 86901 BLOOD TYPING SEROLOGIC RH(D): CPT

## 2019-02-26 PROCEDURE — C1776: CPT

## 2019-02-26 PROCEDURE — 80048 BASIC METABOLIC PNL TOTAL CA: CPT

## 2019-02-26 PROCEDURE — C1889: CPT

## 2019-02-26 PROCEDURE — 88300 SURGICAL PATH GROSS: CPT

## 2019-02-26 PROCEDURE — C1713: CPT

## 2019-02-26 PROCEDURE — 86850 RBC ANTIBODY SCREEN: CPT

## 2019-02-26 PROCEDURE — 97116 GAIT TRAINING THERAPY: CPT

## 2019-02-26 PROCEDURE — 72170 X-RAY EXAM OF PELVIS: CPT

## 2020-08-31 PROBLEM — K46.9 UNSPECIFIED ABDOMINAL HERNIA WITHOUT OBSTRUCTION OR GANGRENE: Chronic | Status: ACTIVE | Noted: 2019-01-31

## 2020-08-31 PROBLEM — I10 ESSENTIAL (PRIMARY) HYPERTENSION: Chronic | Status: ACTIVE | Noted: 2019-02-01

## 2020-08-31 PROBLEM — M23.306 OTHER MENISCUS DERANGEMENTS, UNSPECIFIED MENISCUS, RIGHT KNEE: Chronic | Status: ACTIVE | Noted: 2019-01-31

## 2020-08-31 PROBLEM — S42.92XA FRACTURE OF LEFT SHOULDER GIRDLE, PART UNSPECIFIED, INITIAL ENCOUNTER FOR CLOSED FRACTURE: Chronic | Status: ACTIVE | Noted: 2019-02-01

## 2020-08-31 PROBLEM — C61 MALIGNANT NEOPLASM OF PROSTATE: Chronic | Status: ACTIVE | Noted: 2019-01-31

## 2020-08-31 PROBLEM — E78.00 PURE HYPERCHOLESTEROLEMIA, UNSPECIFIED: Chronic | Status: ACTIVE | Noted: 2019-02-01

## 2020-09-08 ENCOUNTER — TRANSCRIPTION ENCOUNTER (OUTPATIENT)
Age: 80
End: 2020-09-08

## 2020-09-08 RX ORDER — POVIDONE-IODINE 5 %
1 AEROSOL (ML) TOPICAL ONCE
Refills: 0 | Status: COMPLETED | OUTPATIENT
Start: 2020-09-09 | End: 2020-09-09

## 2020-09-08 RX ORDER — METOPROLOL TARTRATE 50 MG
1 TABLET ORAL
Qty: 0 | Refills: 0 | DISCHARGE

## 2020-09-08 RX ORDER — ATORVASTATIN CALCIUM 80 MG/1
1 TABLET, FILM COATED ORAL
Qty: 0 | Refills: 0 | DISCHARGE

## 2020-09-08 NOTE — H&P ADULT - NSICDXPASTSURGICALHX_GEN_ALL_CORE_FT
PAST SURGICAL HISTORY:  Elective surgery right meniscus repair    H/O hernia repair     History of cholecystectomy

## 2020-09-08 NOTE — H&P ADULT - PROBLEM SELECTOR PLAN 1
Admit to Orthopaedic Service.  Presents today for elective Laminectomy C3-C7, L2-L3, L5-S1  Pt medically stable and cleared for procedure today by Dr. Hdz

## 2020-09-08 NOTE — H&P ADULT - NSHPLABSRESULTS_GEN_ALL_CORE
Preop CBC, BMP, PT/PTT/INR, UA - WNL per medical clearance   Preop EKG - WNL per medical clearance   Covid Swab - ______ Preop CBC, BMP, PT/PTT/INR, UA - WNL per medical clearance   Preop EKG - WNL per medical clearance   Covid Swab negative on 9/8/20  Povidone iodine nasal swab to be given day of surgery

## 2020-09-08 NOTE — H&P ADULT - HISTORY OF PRESENT ILLNESS
79M c/o neck and low back pain x       Present for elective Cervical Laminectomy C3-C7, Lumbar Laminectomy L2-L3, L5-S1 79M c/o neck and low back pain x chronic, worsening in the last year s/p hip replacement surgery. The complains mostly of low back pain which radiates to his right glute and minimal neck pain as well as a "loss of balance" s/p three falls. The patient ambulates with assistance of a cane. Failed conservative therapies for his symptoms. Takes celebrex for his pain. Denies hx of DVT.         Present for elective Cervical Laminectomy C3-C7, Lumbar Laminectomy L2-L3, L5-S1

## 2020-09-08 NOTE — H&P ADULT - NSHPPHYSICALEXAM_GEN_ALL_CORE
MSK: + decreased ROM 2/2 pain, cervical spine, lumbar spine       Remainder of exam per medical clearance note MSK: Skin warm and well perfused.  Sensation intact and equal bilateral lower extremities. EHL/TA/GS/FHL 5/5 bilateral lower extremities. Moving bilateral upper extremities. + decreased ROM 2/2 pain, cervical spine, lumbar spine.       Remainder of exam per medical clearance note

## 2020-09-08 NOTE — H&P ADULT - NSICDXPASTMEDICALHX_GEN_ALL_CORE_FT
PAST MEDICAL HISTORY:  Broken shoulder, left, closed, initial encounter greater tuberosity fx    Hernia     High cholesterol     HTN (hypertension)     Meniscus degeneration, right     Prostate cancer seeds implants

## 2020-09-09 ENCOUNTER — TRANSCRIPTION ENCOUNTER (OUTPATIENT)
Age: 80
End: 2020-09-09

## 2020-09-09 ENCOUNTER — INPATIENT (INPATIENT)
Facility: HOSPITAL | Age: 80
LOS: 5 days | Discharge: HOME HEALTH | DRG: 519 | End: 2020-09-15
Payer: MEDICARE

## 2020-09-09 VITALS
OXYGEN SATURATION: 96 % | DIASTOLIC BLOOD PRESSURE: 78 MMHG | HEART RATE: 65 BPM | TEMPERATURE: 98 F | HEIGHT: 71 IN | SYSTOLIC BLOOD PRESSURE: 123 MMHG | WEIGHT: 167.99 LBS | RESPIRATION RATE: 16 BRPM

## 2020-09-09 DIAGNOSIS — I10 ESSENTIAL (PRIMARY) HYPERTENSION: ICD-10-CM

## 2020-09-09 DIAGNOSIS — Z96.642 PRESENCE OF LEFT ARTIFICIAL HIP JOINT: Chronic | ICD-10-CM

## 2020-09-09 DIAGNOSIS — M48.00 SPINAL STENOSIS, SITE UNSPECIFIED: ICD-10-CM

## 2020-09-09 DIAGNOSIS — Z98.890 OTHER SPECIFIED POSTPROCEDURAL STATES: Chronic | ICD-10-CM

## 2020-09-09 DIAGNOSIS — Z90.49 ACQUIRED ABSENCE OF OTHER SPECIFIED PARTS OF DIGESTIVE TRACT: Chronic | ICD-10-CM

## 2020-09-09 DIAGNOSIS — E78.00 PURE HYPERCHOLESTEROLEMIA, UNSPECIFIED: ICD-10-CM

## 2020-09-09 DIAGNOSIS — Z41.9 ENCOUNTER FOR PROCEDURE FOR PURPOSES OTHER THAN REMEDYING HEALTH STATE, UNSPECIFIED: Chronic | ICD-10-CM

## 2020-09-09 LAB
HAV IGM SER-ACNC: SIGNIFICANT CHANGE UP
HBV CORE AB SER-ACNC: SIGNIFICANT CHANGE UP
HBV CORE IGM SER-ACNC: SIGNIFICANT CHANGE UP
HBV SURFACE AB SER-ACNC: SIGNIFICANT CHANGE UP
HBV SURFACE AG SER-ACNC: SIGNIFICANT CHANGE UP
HCV AB S/CO SERPL IA: 0.07 S/CO — SIGNIFICANT CHANGE UP
HCV AB SERPL-IMP: SIGNIFICANT CHANGE UP
HIV 1+2 AB+HIV1 P24 AG SERPL QL IA: SIGNIFICANT CHANGE UP

## 2020-09-09 RX ORDER — CEFAZOLIN SODIUM 1 G
2000 VIAL (EA) INJECTION EVERY 8 HOURS
Refills: 0 | Status: COMPLETED | OUTPATIENT
Start: 2020-09-09 | End: 2020-09-09

## 2020-09-09 RX ORDER — SENNA PLUS 8.6 MG/1
2 TABLET ORAL AT BEDTIME
Refills: 0 | Status: DISCONTINUED | OUTPATIENT
Start: 2020-09-09 | End: 2020-09-15

## 2020-09-09 RX ORDER — OXYCODONE AND ACETAMINOPHEN 5; 325 MG/1; MG/1
2 TABLET ORAL EVERY 6 HOURS
Refills: 0 | Status: DISCONTINUED | OUTPATIENT
Start: 2020-09-09 | End: 2020-09-11

## 2020-09-09 RX ORDER — LOPERAMIDE HCL 2 MG
0 TABLET ORAL
Qty: 0 | Refills: 0 | DISCHARGE

## 2020-09-09 RX ORDER — ATORVASTATIN CALCIUM 80 MG/1
10 TABLET, FILM COATED ORAL AT BEDTIME
Refills: 0 | Status: DISCONTINUED | OUTPATIENT
Start: 2020-09-09 | End: 2020-09-15

## 2020-09-09 RX ORDER — OXYCODONE AND ACETAMINOPHEN 5; 325 MG/1; MG/1
1 TABLET ORAL EVERY 4 HOURS
Refills: 0 | Status: DISCONTINUED | OUTPATIENT
Start: 2020-09-09 | End: 2020-09-11

## 2020-09-09 RX ORDER — ATORVASTATIN CALCIUM 80 MG/1
1 TABLET, FILM COATED ORAL
Qty: 0 | Refills: 0 | DISCHARGE

## 2020-09-09 RX ORDER — HYDROMORPHONE HYDROCHLORIDE 2 MG/ML
0.5 INJECTION INTRAMUSCULAR; INTRAVENOUS; SUBCUTANEOUS EVERY 4 HOURS
Refills: 0 | Status: DISCONTINUED | OUTPATIENT
Start: 2020-09-09 | End: 2020-09-15

## 2020-09-09 RX ORDER — SODIUM CHLORIDE 9 MG/ML
1000 INJECTION, SOLUTION INTRAVENOUS
Refills: 0 | Status: DISCONTINUED | OUTPATIENT
Start: 2020-09-09 | End: 2020-09-15

## 2020-09-09 RX ORDER — ACETAMINOPHEN 500 MG
650 TABLET ORAL EVERY 6 HOURS
Refills: 0 | Status: DISCONTINUED | OUTPATIENT
Start: 2020-09-09 | End: 2020-09-11

## 2020-09-09 RX ORDER — METOPROLOL TARTRATE 50 MG
1 TABLET ORAL
Qty: 0 | Refills: 0 | DISCHARGE

## 2020-09-09 RX ORDER — ONDANSETRON 8 MG/1
4 TABLET, FILM COATED ORAL EVERY 6 HOURS
Refills: 0 | Status: DISCONTINUED | OUTPATIENT
Start: 2020-09-09 | End: 2020-09-15

## 2020-09-09 RX ORDER — INFLUENZA VIRUS VACCINE 15; 15; 15; 15 UG/.5ML; UG/.5ML; UG/.5ML; UG/.5ML
0.7 SUSPENSION INTRAMUSCULAR ONCE
Refills: 0 | Status: DISCONTINUED | OUTPATIENT
Start: 2020-09-09 | End: 2020-09-15

## 2020-09-09 RX ORDER — CHLORHEXIDINE GLUCONATE 213 G/1000ML
1 SOLUTION TOPICAL ONCE
Refills: 0 | Status: COMPLETED | OUTPATIENT
Start: 2020-09-09 | End: 2020-09-09

## 2020-09-09 RX ORDER — METOPROLOL TARTRATE 50 MG
25 TABLET ORAL DAILY
Refills: 0 | Status: DISCONTINUED | OUTPATIENT
Start: 2020-09-09 | End: 2020-09-15

## 2020-09-09 RX ORDER — INFLUENZA VIRUS VACCINE 15; 15; 15; 15 UG/.5ML; UG/.5ML; UG/.5ML; UG/.5ML
0.5 SUSPENSION INTRAMUSCULAR ONCE
Refills: 0 | Status: DISCONTINUED | OUTPATIENT
Start: 2020-09-09 | End: 2020-09-09

## 2020-09-09 RX ORDER — MAGNESIUM HYDROXIDE 400 MG/1
30 TABLET, CHEWABLE ORAL EVERY 12 HOURS
Refills: 0 | Status: DISCONTINUED | OUTPATIENT
Start: 2020-09-09 | End: 2020-09-15

## 2020-09-09 RX ORDER — HYDROMORPHONE HYDROCHLORIDE 2 MG/ML
0.5 INJECTION INTRAMUSCULAR; INTRAVENOUS; SUBCUTANEOUS
Refills: 0 | Status: DISCONTINUED | OUTPATIENT
Start: 2020-09-09 | End: 2020-09-09

## 2020-09-09 RX ORDER — ASPIRIN/CALCIUM CARB/MAGNESIUM 324 MG
1 TABLET ORAL
Qty: 0 | Refills: 0 | DISCHARGE

## 2020-09-09 RX ADMIN — CHLORHEXIDINE GLUCONATE 1 APPLICATION(S): 213 SOLUTION TOPICAL at 06:30

## 2020-09-09 RX ADMIN — HYDROMORPHONE HYDROCHLORIDE 0.5 MILLIGRAM(S): 2 INJECTION INTRAMUSCULAR; INTRAVENOUS; SUBCUTANEOUS at 14:29

## 2020-09-09 RX ADMIN — Medication 100 MILLIGRAM(S): at 16:17

## 2020-09-09 RX ADMIN — Medication 1 APPLICATION(S): at 06:51

## 2020-09-09 RX ADMIN — HYDROMORPHONE HYDROCHLORIDE 0.5 MILLIGRAM(S): 2 INJECTION INTRAMUSCULAR; INTRAVENOUS; SUBCUTANEOUS at 22:38

## 2020-09-09 RX ADMIN — HYDROMORPHONE HYDROCHLORIDE 0.5 MILLIGRAM(S): 2 INJECTION INTRAMUSCULAR; INTRAVENOUS; SUBCUTANEOUS at 15:28

## 2020-09-09 RX ADMIN — Medication 100 MILLIGRAM(S): at 23:11

## 2020-09-09 RX ADMIN — ATORVASTATIN CALCIUM 10 MILLIGRAM(S): 80 TABLET, FILM COATED ORAL at 22:20

## 2020-09-09 RX ADMIN — HYDROMORPHONE HYDROCHLORIDE 0.5 MILLIGRAM(S): 2 INJECTION INTRAMUSCULAR; INTRAVENOUS; SUBCUTANEOUS at 15:13

## 2020-09-09 RX ADMIN — HYDROMORPHONE HYDROCHLORIDE 0.5 MILLIGRAM(S): 2 INJECTION INTRAMUSCULAR; INTRAVENOUS; SUBCUTANEOUS at 22:20

## 2020-09-09 RX ADMIN — HYDROMORPHONE HYDROCHLORIDE 0.5 MILLIGRAM(S): 2 INJECTION INTRAMUSCULAR; INTRAVENOUS; SUBCUTANEOUS at 16:21

## 2020-09-09 RX ADMIN — OXYCODONE AND ACETAMINOPHEN 1 TABLET(S): 5; 325 TABLET ORAL at 19:26

## 2020-09-09 NOTE — BRIEF OPERATIVE NOTE - NSICDXBRIEFPROCEDURE_GEN_ALL_CORE_FT
PROCEDURES:  Laminectomy, cervical, 4 levels 09-Sep-2020 13:59:02  Jc Salazar  Lumbar laminectomy 09-Sep-2020 13:58:50  Jc Salazar

## 2020-09-09 NOTE — BRIEF OPERATIVE NOTE - NSICDXBRIEFPREOP_GEN_ALL_CORE_FT
PRE-OP DIAGNOSIS:  Lumbar spinal stenosis 09-Sep-2020 13:59:15  Jc Salazar  Cervical spinal stenosis 09-Sep-2020 13:59:09  Jc Salazar

## 2020-09-09 NOTE — DISCHARGE NOTE PROVIDER - NSDCCPCAREPLAN_GEN_ALL_CORE_FT
PRINCIPAL DISCHARGE DIAGNOSIS  Diagnosis: Spinal stenosis  Assessment and Plan of Treatment: improvement s/p Cervical and Lumbar laminectomy

## 2020-09-09 NOTE — DISCHARGE NOTE PROVIDER - HOSPITAL COURSE
Admitted    Surgery Cervical and Lumbar laminectomy    Jany-op Antibiotics    Pain control    DVT prophylaxis    OOB/Physical Therapy Admitted 9/9/20  Surgery Cervical and Lumbar laminectomy  Jany-op Antibiotics  Pain control  DVT prophylaxis  OOB/Physical Therapy

## 2020-09-09 NOTE — DISCHARGE NOTE PROVIDER - NSDCACTIVITY_GEN_ALL_CORE
Stairs allowed/Walking - Indoors allowed/No heavy lifting/straining/Walking - Outdoors allowed/Do not drive or operate machinery

## 2020-09-09 NOTE — DISCHARGE NOTE PROVIDER - NSDCMRMEDTOKEN_GEN_ALL_CORE_FT
Aspir 81 oral delayed release tablet: 1 tab(s) orally once a day  atorvastatin 10 mg oral tablet: 1 tab(s) orally once a day (at bedtime)  Imodium:   metoprolol tartrate 25 mg oral tablet: 1 tab(s) orally once a day (at bedtime) acetaminophen 325 mg oral tablet: 2 tab(s) orally every 6 hours, As Needed for mild pain or fever 100.3 or higher  DO not exceed 4000mg/day  Aspir 81 oral delayed release tablet: 1 tab(s) orally once a day  atorvastatin 10 mg oral tablet: 1 tab(s) orally once a day (at bedtime)  Imodium:   metoprolol tartrate 25 mg oral tablet: 1 tab(s) orally once a day (at bedtime)  senna oral tablet: 2 tab(s) orally once a day (at bedtime)  traMADol 50 mg oral tablet: 0.5 to 1 tab(s) orally every 4 hours, As needed, Moderate to severe Pain (4 - 6) MDD:3

## 2020-09-09 NOTE — PRE-OP CHECKLIST - SURGICAL CONSENT
Last office visit 03/27/17.  Request for Simvastatin. Medication discontinued 03/27/17 with reason of stopped by patient. Please advise.   
done

## 2020-09-09 NOTE — DISCHARGE NOTE PROVIDER - NSDCFUADDINST_GEN_ALL_CORE_FT
No strenuous activity (bending/twisting), heavy lifting, driving or returning to work until cleared by MD.  Change dressings daily with gauze/tape till post-op day #5, then leave incision open to air.  You may shower post-op day#5, keep incisions clean and dry.   Try to have regular bowel movements, take stool softener or laxative if necessary.  May apply ice to affected areas to decrease swelling.  Call to schedule an appt with Dr. George for follow up, if you have staples or sutures they will be removed in office.  Contact your doctor if you experience: fever greater than 101.5, chills, chest pain, difficulty breathing, redness or excessive drainage around the incision, other concerns.  Follow up with your primary care provider.

## 2020-09-09 NOTE — DISCHARGE NOTE PROVIDER - NSDCCPTREATMENT_GEN_ALL_CORE_FT
PRINCIPAL PROCEDURE  Procedure: Laminectomy, cervical, 4 levels  Findings and Treatment:       SECONDARY PROCEDURE  Procedure: Lumbar laminectomy  Findings and Treatment:

## 2020-09-09 NOTE — PROGRESS NOTE ADULT - SUBJECTIVE AND OBJECTIVE BOX
Orthopaedics Post Op Check    Procedure: C3-C7 laminectomy, L1-L3, L5-S1 laminotomy  Surgeon: Dr. George    Pt comfortable, without complaints  Denies CP, SOB, N/V, numbness/tingling     Vital Signs Last 24 Hrs  T(C): 36.2 (09 Sep 2020 14:02), Max: 36.9 (09 Sep 2020 06:26)  T(F): 97.1 (09 Sep 2020 14:02), Max: 98.4 (09 Sep 2020 06:26)  HR: 64 (09 Sep 2020 14:48) (60 - 65)  BP: 142/81 (09 Sep 2020 14:48) (123/78 - 156/87)  BP(mean): 106 (09 Sep 2020 14:48) (101 - 113)  RR: 18 (09 Sep 2020 14:48) (15 - 18)  SpO2: 100% (09 Sep 2020 14:48) (96% - 100%)  AVSS, NAD    Dressing C/D/I; HV x 1 holding suction   General: Pt Alert and oriented     Pulses: DP pulses 2+   Sensation: SLT in tact to distal bilateral lower extremities   Motor: EHL/FHL/TA/GS 5/5 motor strength bilaterally       Post op XR: fluoroscopy utilized intra op to confirm level     A/P: 79yMale POD#0 s/p   - Stable  - Pain Control  - DVT ppx: SCDs  - Post op abx: Ancef  - PT, WBS: WBAT  - F/U AM Labs

## 2020-09-09 NOTE — DISCHARGE NOTE PROVIDER - CARE PROVIDER_API CALL
Piter George)  Orthopaedic Surgery  [address], [address]  [place], [place] [address]  Phone: [phone]  Fax: [phone]  Follow Up Time: 2 weeks

## 2020-09-10 LAB
ANION GAP SERPL CALC-SCNC: 9 MMOL/L — SIGNIFICANT CHANGE UP (ref 5–17)
APPEARANCE UR: ABNORMAL
BACTERIA # UR AUTO: PRESENT /HPF
BASOPHILS # BLD AUTO: 0.03 K/UL — SIGNIFICANT CHANGE UP (ref 0–0.2)
BASOPHILS NFR BLD AUTO: 0.3 % — SIGNIFICANT CHANGE UP (ref 0–2)
BILIRUB UR-MCNC: NEGATIVE — SIGNIFICANT CHANGE UP
BUN SERPL-MCNC: 20 MG/DL — SIGNIFICANT CHANGE UP (ref 7–23)
CALCIUM SERPL-MCNC: 9.4 MG/DL — SIGNIFICANT CHANGE UP (ref 8.4–10.5)
CHLORIDE SERPL-SCNC: 103 MMOL/L — SIGNIFICANT CHANGE UP (ref 96–108)
CO2 SERPL-SCNC: 25 MMOL/L — SIGNIFICANT CHANGE UP (ref 22–31)
COLOR SPEC: YELLOW — SIGNIFICANT CHANGE UP
CREAT SERPL-MCNC: 1.01 MG/DL — SIGNIFICANT CHANGE UP (ref 0.5–1.3)
DIFF PNL FLD: ABNORMAL
EOSINOPHIL # BLD AUTO: 0.01 K/UL — SIGNIFICANT CHANGE UP (ref 0–0.5)
EOSINOPHIL NFR BLD AUTO: 0.1 % — SIGNIFICANT CHANGE UP (ref 0–6)
GLUCOSE SERPL-MCNC: 115 MG/DL — HIGH (ref 70–99)
GLUCOSE UR QL: NEGATIVE — SIGNIFICANT CHANGE UP
HCT VFR BLD CALC: 36.3 % — LOW (ref 39–50)
HGB BLD-MCNC: 12.3 G/DL — LOW (ref 13–17)
IMM GRANULOCYTES NFR BLD AUTO: 0.4 % — SIGNIFICANT CHANGE UP (ref 0–1.5)
KETONES UR-MCNC: NEGATIVE — SIGNIFICANT CHANGE UP
LEUKOCYTE ESTERASE UR-ACNC: NEGATIVE — SIGNIFICANT CHANGE UP
LYMPHOCYTES # BLD AUTO: 1.26 K/UL — SIGNIFICANT CHANGE UP (ref 1–3.3)
LYMPHOCYTES # BLD AUTO: 13.2 % — SIGNIFICANT CHANGE UP (ref 13–44)
MCHC RBC-ENTMCNC: 31.5 PG — SIGNIFICANT CHANGE UP (ref 27–34)
MCHC RBC-ENTMCNC: 33.9 GM/DL — SIGNIFICANT CHANGE UP (ref 32–36)
MCV RBC AUTO: 92.8 FL — SIGNIFICANT CHANGE UP (ref 80–100)
MONOCYTES # BLD AUTO: 0.77 K/UL — SIGNIFICANT CHANGE UP (ref 0–0.9)
MONOCYTES NFR BLD AUTO: 8.1 % — SIGNIFICANT CHANGE UP (ref 2–14)
NEUTROPHILS # BLD AUTO: 7.4 K/UL — SIGNIFICANT CHANGE UP (ref 1.8–7.4)
NEUTROPHILS NFR BLD AUTO: 77.9 % — HIGH (ref 43–77)
NITRITE UR-MCNC: NEGATIVE — SIGNIFICANT CHANGE UP
NRBC # BLD: 0 /100 WBCS — SIGNIFICANT CHANGE UP (ref 0–0)
PH UR: 5.5 — SIGNIFICANT CHANGE UP (ref 5–8)
PLATELET # BLD AUTO: 169 K/UL — SIGNIFICANT CHANGE UP (ref 150–400)
POTASSIUM SERPL-MCNC: 4.8 MMOL/L — SIGNIFICANT CHANGE UP (ref 3.5–5.3)
POTASSIUM SERPL-SCNC: 4.8 MMOL/L — SIGNIFICANT CHANGE UP (ref 3.5–5.3)
PROT UR-MCNC: ABNORMAL MG/DL
RBC # BLD: 3.91 M/UL — LOW (ref 4.2–5.8)
RBC # FLD: 12.8 % — SIGNIFICANT CHANGE UP (ref 10.3–14.5)
RBC CASTS # UR COMP ASSIST: ABNORMAL /HPF
SODIUM SERPL-SCNC: 137 MMOL/L — SIGNIFICANT CHANGE UP (ref 135–145)
SP GR SPEC: >=1.03 — SIGNIFICANT CHANGE UP (ref 1–1.03)
URATE CRY FLD QL MICRO: ABNORMAL /HPF
UROBILINOGEN FLD QL: 0.2 E.U./DL — SIGNIFICANT CHANGE UP
WBC # BLD: 9.51 K/UL — SIGNIFICANT CHANGE UP (ref 3.8–10.5)
WBC # FLD AUTO: 9.51 K/UL — SIGNIFICANT CHANGE UP (ref 3.8–10.5)
WBC UR QL: < 5 /HPF — SIGNIFICANT CHANGE UP

## 2020-09-10 RX ORDER — SODIUM CHLORIDE 9 MG/ML
500 INJECTION, SOLUTION INTRAVENOUS ONCE
Refills: 0 | Status: COMPLETED | OUTPATIENT
Start: 2020-09-10 | End: 2020-09-10

## 2020-09-10 RX ADMIN — OXYCODONE AND ACETAMINOPHEN 2 TABLET(S): 5; 325 TABLET ORAL at 11:04

## 2020-09-10 RX ADMIN — Medication 650 MILLIGRAM(S): at 22:30

## 2020-09-10 RX ADMIN — HYDROMORPHONE HYDROCHLORIDE 0.5 MILLIGRAM(S): 2 INJECTION INTRAMUSCULAR; INTRAVENOUS; SUBCUTANEOUS at 05:00

## 2020-09-10 RX ADMIN — OXYCODONE AND ACETAMINOPHEN 2 TABLET(S): 5; 325 TABLET ORAL at 01:23

## 2020-09-10 RX ADMIN — OXYCODONE AND ACETAMINOPHEN 2 TABLET(S): 5; 325 TABLET ORAL at 18:07

## 2020-09-10 RX ADMIN — OXYCODONE AND ACETAMINOPHEN 2 TABLET(S): 5; 325 TABLET ORAL at 17:07

## 2020-09-10 RX ADMIN — HYDROMORPHONE HYDROCHLORIDE 0.5 MILLIGRAM(S): 2 INJECTION INTRAMUSCULAR; INTRAVENOUS; SUBCUTANEOUS at 04:39

## 2020-09-10 RX ADMIN — SENNA PLUS 2 TABLET(S): 8.6 TABLET ORAL at 21:30

## 2020-09-10 RX ADMIN — OXYCODONE AND ACETAMINOPHEN 2 TABLET(S): 5; 325 TABLET ORAL at 00:23

## 2020-09-10 RX ADMIN — SODIUM CHLORIDE 1000 MILLILITER(S): 9 INJECTION, SOLUTION INTRAVENOUS at 21:31

## 2020-09-10 RX ADMIN — OXYCODONE AND ACETAMINOPHEN 2 TABLET(S): 5; 325 TABLET ORAL at 10:04

## 2020-09-10 RX ADMIN — Medication 25 MILLIGRAM(S): at 04:39

## 2020-09-10 RX ADMIN — Medication 650 MILLIGRAM(S): at 21:30

## 2020-09-10 RX ADMIN — ATORVASTATIN CALCIUM 10 MILLIGRAM(S): 80 TABLET, FILM COATED ORAL at 21:30

## 2020-09-10 RX ADMIN — SODIUM CHLORIDE 1000 MILLILITER(S): 9 INJECTION, SOLUTION INTRAVENOUS at 19:20

## 2020-09-10 NOTE — CHART NOTE - NSCHARTNOTEFT_GEN_A_CORE
Admitting Diagnosis:   Patient is a 79y old  Male who presents with a chief complaint of neck pain, low back pain (10 Sep 2020 11:41)    Consult: Yes [   ]  No [ X ]    Reason for Initial Nutrition Assessment: LOS    PAST MEDICAL & SURGICAL HISTORY:  HTN (hypertension)  High cholesterol  Broken shoulder, left, closed, initial encounter: greater tuberosity fx  Meniscus degeneration, right  Hernia  Prostate cancer: seeds implants  History of hip replacement, total, left: 2019  Elective surgery: right meniscus repair  H/O hernia repair  History of cholecystectomy    Current Nutrition Order: full liquid diet   *advance to regular diet as tolerated (not activated at this time)    PO Intake: Good (%) [   ]  Fair (50-75%) [ X ] Poor (<25%) [   ]-pt with good appetite but limited options on full liquid diet    GI Issues: pt denies N/V, last BM 9/8 per pt (endorses regular BM PTA; ordered for marilyn hydroxide, senna)    Pain: pt endorses pain in his neck    Skin Integrity: Oli score 17    Labs:   09-10    137  |  103  |  20  ----------------------------<  115<H>  4.8   |  25  |  1.01    Ca    9.4      10 Sep 2020 06:50    Nutritionally Pertinent Lab Values:  9/10: Glu 115    Medications:  MEDICATIONS  (STANDING):  atorvastatin 10 milliGRAM(s) Oral at bedtime  influenza  Vaccine (HIGH DOSE) 0.7 milliLiter(s) IntraMuscular once  lactated ringers. 1000 milliLiter(s) (75 mL/Hr) IV Continuous <Continuous>  metoprolol succinate ER 25 milliGRAM(s) Oral daily  senna 2 Tablet(s) Oral at bedtime    MEDICATIONS  (PRN):  acetaminophen   Tablet .. 650 milliGRAM(s) Oral every 6 hours PRN Temp greater or equal to 38.5C (101.3F), Mild Pain (1 - 3)  HYDROmorphone  Injectable 0.5 milliGRAM(s) IV Push every 4 hours PRN breakthrough pain  magnesium hydroxide Suspension 30 milliLiter(s) Oral every 12 hours PRN Constipation  ondansetron Injectable 4 milliGRAM(s) IV Push every 6 hours PRN Nausea  oxycodone    5 mG/acetaminophen 325 mG 1 Tablet(s) Oral every 4 hours PRN Moderate Pain (4 - 6)  oxycodone    5 mG/acetaminophen 325 mG 2 Tablet(s) Oral every 6 hours PRN Severe Pain (7 - 10)    Admitted Anthropometrics:  Ht (9/9): 180.3cm, Wt (9/9): 76.2kg, IBW: 78.2kg+/-10%, %IBW: 97%, BMI: 23.4     Nutrition Focused Physical Exam: Completed [   ]  Unable to complete [   ]-N/A    Estimated energy needs:   ABW (76.2kg) used to calculate energy needs due to pt's current body weight within % IBW.  Needs adjusted for age, post-op.     1597-4528 kcal (25-30 kcal/kg ABW)  91-107gm protein (1.2-1.4gm/kg ABW)  2286-2667mL (30-35mL/kg ABW)    Subjective: 79yMale hx HTN, HLD, now s/p C3-7 laminectomy, L1-L3, L5-S1 laminotomy on 9/9. Pt seen resting in bed, tolerating full liquid diet (consumed apple sauce and yogurt this AM) but feels like options are limited. Of note, advance as tolerated to regular diet order awaiting verification. Pt states appetite good PTA, pt follows regular diet at baseline, denies food allergies. Pt states UBW is between 168-172lb, current weighing in at 168lb, pt denies significant weight changes PTA. Team ok with advancing diet to regular. Please see below for full nutritional recommendations- d/w team. RD to monitor and f/u per protocol.    Nutrition Diagnosis:  Increased nutrient needs RT increased kcal/protein demand AEB post-op     Goal: Pt to meet >/=75%EER PO with good tolerance     Recommendations:  1. As medically appropriate, recommend advance diet towards regular (recommend liberalized diet)  2. Monitor labs (BMP, lytes); replete lytes prn  3. Trend weekly weights    Education: encouraged adequate protein intake for healing post-op; diet advancement- pt appeared receptive    Risk Level: High [   ] Moderate [ X ]  Low [   ]

## 2020-09-10 NOTE — PROGRESS NOTE ADULT - SUBJECTIVE AND OBJECTIVE BOX
Orthopaedics Progress Note    Procedure: C3-C7 laminectomy, L1-L3, L5-S1 laminotomy  Surgeon: Dr. George    Pt comfortable, without complaints  Denies CP, SOB, N/V, numbness/tingling     Vital Signs Last 24 Hrs  T(C): 36.8 (10 Sep 2020 04:34), Max: 37.4 (09 Sep 2020 19:34)  T(F): 98.2 (10 Sep 2020 04:34), Max: 99.3 (09 Sep 2020 19:34)  HR: 73 (10 Sep 2020 04:34) (60 - 80)  BP: 131/76 (10 Sep 2020 04:34) (131/76 - 167/96)  BP(mean): 111 (09 Sep 2020 16:42) (101 - 113)  RR: 18 (10 Sep 2020 04:34) (15 - 18)  SpO2: 98% (10 Sep 2020 04:34) (96% - 100%)    Dressing C/D/I; HV x 2 holding suction   General: Pt Alert and oriented     Pulses: DP pulses 2+   Sensation: SLT in tact to distal bilateral lower extremities   Motor: EHL/FHL/TA/GS 5/5 motor strength bilaterally       A/P: 79yMale POD#1 s/p C3-C7 laminectomy, L1-L3, L5-S1 laminotomy  - Stable  - Pain Control  - DVT ppx: SCDs  - Post op abx: Ancef  - PT, WBS: WBAT  - F/U AM Labs  - Dispo: pending PT eval

## 2020-09-10 NOTE — PHYSICAL THERAPY INITIAL EVALUATION ADULT - ADDITIONAL COMMENTS
Patient reports that he lives in an elevator building with no KANG. Reports using a cane for ambulation at baseline, states that he has a rolling walker at home.

## 2020-09-10 NOTE — PHYSICAL THERAPY INITIAL EVALUATION ADULT - MANUAL MUSCLE TESTING RESULTS, REHAB EVAL
Shoulder flexion limited to 3-/5 (B) 2/2 pain in cervical region with elevation of UEs. All others (B) 5/5: elbow flex/ext, . (B) LEs 5/5: hip flex, knee flex/ext, ankle DF, hallus ext.

## 2020-09-10 NOTE — PROGRESS NOTE ADULT - SUBJECTIVE AND OBJECTIVE BOX
Orthopaedic Surgery Progress Note    Post-operative day #1 s/p C3-7 laminectomy, L1-L3, L5-S1 laminotomy     Subjective:     Patient seen and examined. Patient comfortable. States low back/right glute pain has improved. Complains of neck pain.   Denies chest pain, shortness of breath, nausea/vomiting, numbness/tingling.    Objective:    Vital Signs Last 24 Hrs  T(C): 36.7 (09-10-20 @ 08:37), Max: 36.7 (09-10-20 @ 08:37)  T(F): 98 (09-10-20 @ 08:37), Max: 98 (09-10-20 @ 08:37)  HR: 71 (09-10-20 @ 08:37) (71 - 71)  BP: 122/73 (09-10-20 @ 08:37) (122/73 - 122/73)  BP(mean): --  RR: 13 (09-10-20 @ 08:37) (13 - 13)  SpO2: 95% (09-10-20 @ 08:37) (95% - 95%)  AVSS    PE:  General: Patient alert and oriented, NAD  Dressing check deferred 2/2 patient lying on back; HVx2 holding suction    strength intact bilateral upper extremities; firing biceps/triceps bilateral upper extremities; radial pulses palpable bilateral upper extremities   Sensation intact to bilateral lower extremities. Skin well perfused. Firing  EHL/FHL/TA/GS bilateral lower extremities       09-09-20 @ 07:01  -  09-10-20 @ 07:00  --------------------------------------------------------  OUT: 225 mL                              12.3   9.51  )-----------( 169      ( 10 Sep 2020 06:50 )             36.3   10 Sep 2020 06:50    137    |  103    |  20     ----------------------------<  115    4.8     |  25     |  1.01     Ca    9.4        10 Sep 2020 06:50        A/P: 79yMale POD#1 s/p above procedure   1. Pain control as needed  2. DVT prophylaxis: SCDs  3. PT, weight-bearing status: WBAT; PT recommends OT consult   4. Dispo: pending  5. hemovac drain management     Ortho Pager [phone]

## 2020-09-10 NOTE — PHYSICAL THERAPY INITIAL EVALUATION ADULT - GENERAL OBSERVATIONS, REHAB EVAL
Patient received supine in bed with + heplock IV, hemovac x 2, (B) SCDs. Patient in no apparent distress.

## 2020-09-10 NOTE — PHYSICAL THERAPY INITIAL EVALUATION ADULT - PERTINENT HX OF CURRENT PROBLEM, REHAB EVAL
79M c/o neck and low back pain x chronic, worsening in the last year s/p hip replacement surgery. The complains mostly of low back pain which radiates to his right glute and minimal neck pain as well as a "loss of balance" s/p three falls. The patient ambulates with assistance of a cane. S/P workup, now s/p above procedure.

## 2020-09-10 NOTE — PHYSICAL THERAPY INITIAL EVALUATION ADULT - CRITERIA FOR SKILLED THERAPEUTIC INTERVENTIONS
impairments found/therapy frequency/anticipated discharge recommendation/rehab potential/anticipated equipment needs at discharge

## 2020-09-10 NOTE — PHYSICAL THERAPY INITIAL EVALUATION ADULT - LIVES WITH, PROFILE
Wife. Of note, patient reports that his wife has functional impairments related to Alzheimer's Disease and has a home health aide.

## 2020-09-11 LAB
ANION GAP SERPL CALC-SCNC: 11 MMOL/L — SIGNIFICANT CHANGE UP (ref 5–17)
BASOPHILS # BLD AUTO: 0.03 K/UL — SIGNIFICANT CHANGE UP (ref 0–0.2)
BASOPHILS NFR BLD AUTO: 0.3 % — SIGNIFICANT CHANGE UP (ref 0–2)
BUN SERPL-MCNC: 17 MG/DL — SIGNIFICANT CHANGE UP (ref 7–23)
CALCIUM SERPL-MCNC: 8.8 MG/DL — SIGNIFICANT CHANGE UP (ref 8.4–10.5)
CHLORIDE SERPL-SCNC: 101 MMOL/L — SIGNIFICANT CHANGE UP (ref 96–108)
CO2 SERPL-SCNC: 24 MMOL/L — SIGNIFICANT CHANGE UP (ref 22–31)
CREAT SERPL-MCNC: 0.84 MG/DL — SIGNIFICANT CHANGE UP (ref 0.5–1.3)
EOSINOPHIL # BLD AUTO: 0.04 K/UL — SIGNIFICANT CHANGE UP (ref 0–0.5)
EOSINOPHIL NFR BLD AUTO: 0.5 % — SIGNIFICANT CHANGE UP (ref 0–6)
GLUCOSE SERPL-MCNC: 136 MG/DL — HIGH (ref 70–99)
HCT VFR BLD CALC: 34.5 % — LOW (ref 39–50)
HGB BLD-MCNC: 11.5 G/DL — LOW (ref 13–17)
IMM GRANULOCYTES NFR BLD AUTO: 0.3 % — SIGNIFICANT CHANGE UP (ref 0–1.5)
LYMPHOCYTES # BLD AUTO: 0.91 K/UL — LOW (ref 1–3.3)
LYMPHOCYTES # BLD AUTO: 10.2 % — LOW (ref 13–44)
MCHC RBC-ENTMCNC: 30.6 PG — SIGNIFICANT CHANGE UP (ref 27–34)
MCHC RBC-ENTMCNC: 33.3 GM/DL — SIGNIFICANT CHANGE UP (ref 32–36)
MCV RBC AUTO: 91.8 FL — SIGNIFICANT CHANGE UP (ref 80–100)
MONOCYTES # BLD AUTO: 0.74 K/UL — SIGNIFICANT CHANGE UP (ref 0–0.9)
MONOCYTES NFR BLD AUTO: 8.3 % — SIGNIFICANT CHANGE UP (ref 2–14)
NEUTROPHILS # BLD AUTO: 7.13 K/UL — SIGNIFICANT CHANGE UP (ref 1.8–7.4)
NEUTROPHILS NFR BLD AUTO: 80.4 % — HIGH (ref 43–77)
NRBC # BLD: 0 /100 WBCS — SIGNIFICANT CHANGE UP (ref 0–0)
PLATELET # BLD AUTO: 152 K/UL — SIGNIFICANT CHANGE UP (ref 150–400)
POTASSIUM SERPL-MCNC: 3.9 MMOL/L — SIGNIFICANT CHANGE UP (ref 3.5–5.3)
POTASSIUM SERPL-SCNC: 3.9 MMOL/L — SIGNIFICANT CHANGE UP (ref 3.5–5.3)
RBC # BLD: 3.76 M/UL — LOW (ref 4.2–5.8)
RBC # FLD: 12.7 % — SIGNIFICANT CHANGE UP (ref 10.3–14.5)
SODIUM SERPL-SCNC: 136 MMOL/L — SIGNIFICANT CHANGE UP (ref 135–145)
WBC # BLD: 8.88 K/UL — SIGNIFICANT CHANGE UP (ref 3.8–10.5)
WBC # FLD AUTO: 8.88 K/UL — SIGNIFICANT CHANGE UP (ref 3.8–10.5)

## 2020-09-11 RX ORDER — ACETAMINOPHEN 500 MG
650 TABLET ORAL EVERY 6 HOURS
Refills: 0 | Status: DISCONTINUED | OUTPATIENT
Start: 2020-09-11 | End: 2020-09-15

## 2020-09-11 RX ORDER — OXYCODONE HYDROCHLORIDE 5 MG/1
5 TABLET ORAL EVERY 4 HOURS
Refills: 0 | Status: DISCONTINUED | OUTPATIENT
Start: 2020-09-11 | End: 2020-09-13

## 2020-09-11 RX ORDER — OXYCODONE HYDROCHLORIDE 5 MG/1
10 TABLET ORAL EVERY 4 HOURS
Refills: 0 | Status: DISCONTINUED | OUTPATIENT
Start: 2020-09-11 | End: 2020-09-13

## 2020-09-11 RX ADMIN — Medication 650 MILLIGRAM(S): at 12:05

## 2020-09-11 RX ADMIN — Medication 650 MILLIGRAM(S): at 23:05

## 2020-09-11 RX ADMIN — Medication 25 MILLIGRAM(S): at 05:50

## 2020-09-11 RX ADMIN — HYDROMORPHONE HYDROCHLORIDE 0.5 MILLIGRAM(S): 2 INJECTION INTRAMUSCULAR; INTRAVENOUS; SUBCUTANEOUS at 01:36

## 2020-09-11 RX ADMIN — Medication 650 MILLIGRAM(S): at 13:06

## 2020-09-11 RX ADMIN — ATORVASTATIN CALCIUM 10 MILLIGRAM(S): 80 TABLET, FILM COATED ORAL at 21:54

## 2020-09-11 RX ADMIN — HYDROMORPHONE HYDROCHLORIDE 0.5 MILLIGRAM(S): 2 INJECTION INTRAMUSCULAR; INTRAVENOUS; SUBCUTANEOUS at 10:05

## 2020-09-11 RX ADMIN — HYDROMORPHONE HYDROCHLORIDE 0.5 MILLIGRAM(S): 2 INJECTION INTRAMUSCULAR; INTRAVENOUS; SUBCUTANEOUS at 09:48

## 2020-09-11 RX ADMIN — HYDROMORPHONE HYDROCHLORIDE 0.5 MILLIGRAM(S): 2 INJECTION INTRAMUSCULAR; INTRAVENOUS; SUBCUTANEOUS at 01:55

## 2020-09-11 RX ADMIN — SENNA PLUS 2 TABLET(S): 8.6 TABLET ORAL at 21:54

## 2020-09-11 NOTE — OCCUPATIONAL THERAPY INITIAL EVALUATION ADULT - MD ORDER
79M c/o neck and low back pain x chronic, worsening in the last year s/p hip replacement surgery. The complains mostly of low back pain which radiates to his right glute and minimal neck pain as well as a "loss of balance" s/p three falls. The patient ambulates with assistance of a cane. Failed conservative therapies for his symptoms. Takes celebrex for his pain. Denies hx of DVT.

## 2020-09-11 NOTE — PROGRESS NOTE ADULT - SUBJECTIVE AND OBJECTIVE BOX
Orthopaedic Surgery Progress Note    Post-operative day #2 s/p C3-7 laminectomy, L1-L3, L5-S1 laminotomy     Subjective:     Patient seen and examined. Patient comfortable. Complains of neck pain this AM.   Denies chest pain, shortness of breath, nausea/vomiting, numbness/tingling.    Objective:    Vital Signs Last 24 Hrs  T(C): 36.7 (11 Sep 2020 05:11), Max: 37.7 (10 Sep 2020 20:38)  T(F): 98 (11 Sep 2020 05:11), Max: 99.8 (10 Sep 2020 20:38)  HR: 73 (11 Sep 2020 05:11) (68 - 73)  BP: 139/80 (11 Sep 2020 05:11) (109/70 - 139/80)  BP(mean): --  RR: 16 (11 Sep 2020 05:11) (13 - 16)  SpO2: 94% (11 Sep 2020 05:11) (94% - 95%)    PE:  General: Patient alert and oriented, NAD  Dressing check deferred 2/2 patient lying on back; HVx2 holding suction    strength intact bilateral upper extremities; firing biceps/triceps bilateral upper extremities; radial pulses palpable bilateral upper extremities   Sensation intact to bilateral lower extremities. Skin well perfused. Firing  EHL/FHL/TA/GS bilateral lower extremities                             11.5   8.88  )-----------( 152      ( 11 Sep 2020 06:43 )             34.5         A/P: 79yMale POD#2 s/p above procedure   1. Pain control as needed  2. DVT prophylaxis: SCDs  3. PT, weight-bearing status: WBAT; PT recommends OT consult   4. Dispo: home vs ADAM pending PT progress  5. hemovac drain management     Ortho Pager [phone]

## 2020-09-11 NOTE — OCCUPATIONAL THERAPY INITIAL EVALUATION ADULT - STANDING BALANCE: DYNAMIC, REHAB EVAL
poor plus/Patient took 6 side-steps at EOB with RW and Enedina. Further functional mobility deferred 2/2 patient with c/o pain

## 2020-09-11 NOTE — OCCUPATIONAL THERAPY INITIAL EVALUATION ADULT - REHAB POTENTIAL, OT EVAL

## 2020-09-11 NOTE — OCCUPATIONAL THERAPY INITIAL EVALUATION ADULT - ADDITIONAL COMMENTS
Patient reporting independence with ADLs and functional mobility PTA with use of SC. Patient reports grab bars in bathroom and having a shower chair however denies recent use. Patient reports that he lives with his wife but that she requires HHA and will not be available to assist him during recovery.

## 2020-09-11 NOTE — PROGRESS NOTE ADULT - SUBJECTIVE AND OBJECTIVE BOX
Orthopaedic Surgery Progress Note    Post-operative day #2 s/p C3-C7 laminectomy, L1-L3, L5-S1 laminotomy    Subjective:     Patient seen and examined. Complaining of neck pain. States the pain does not improve with his current pain regimen.  Denies chest pain, shortness of breath, nausea/vomiting, numbness/tingling.    Objective:    Vital Signs Last 24 Hrs  T(C): 36.6 (09-11-20 @ 08:20), Max: 36.6 (09-11-20 @ 08:20)  T(F): 97.9 (09-11-20 @ 08:20), Max: 97.9 (09-11-20 @ 08:20)  HR: 67 (09-11-20 @ 08:20) (67 - 67)  BP: 133/77 (09-11-20 @ 08:20) (133/77 - 133/77)  RR: 17 (09-11-20 @ 08:20) (17 - 17)  SpO2: 94% (09-11-20 @ 08:20) (94% - 94%)  AVSS    PE:  General: Patient alert and oriented, NAD  Dressing: Clean/dry/intact, HVs in place  Pulses: 2+ DP BLE, 2+ radial pulse BUE   Sensation: SILT BLE and BUE   Motor: EHL/FHL/TA/GS 5/5 BLE, /biceps/triceps 5/5 BUE                          11.5   8.88  )-----------( 152      ( 11 Sep 2020 06:43 )             34.5   11 Sep 2020 06:43    136    |  101    |  17     ----------------------------<  136    3.9     |  24     |  0.84     Ca    8.8        11 Sep 2020 06:43    HV x 2  58 in 24 hours, 58 overnight  53 in 24 hours, 53 overnight     A/P: 79yMale POD#2 s/p C3-C7 laminectomy, L1-L3, L5-S1 laminotomy  1. Pain control as needed. Changed pain regimen. Added standing tylenol, oxy 5 for moderate and oxy 10 for severe. Cancelled PRN percocet.  2. DVT prophylaxis: SCDs  3. PT/OT, weight-bearing status: WBAT   4. Dispo: Home with home PT vs home no needs pending progress    Ortho Pager [phone]

## 2020-09-11 NOTE — OCCUPATIONAL THERAPY INITIAL EVALUATION ADULT - GENERAL OBSERVATIONS, REHAB EVAL
Patient right hand dominant. Chart reviewed, GÉNESIS Rodriguez cleared patient for OT evaluation. Patient received supine in bed, NAD, +IV heplock, +SCDs, +[place], +hemovac x2.

## 2020-09-12 RX ADMIN — Medication 650 MILLIGRAM(S): at 18:11

## 2020-09-12 RX ADMIN — Medication 650 MILLIGRAM(S): at 23:53

## 2020-09-12 RX ADMIN — Medication 650 MILLIGRAM(S): at 05:00

## 2020-09-12 RX ADMIN — SENNA PLUS 2 TABLET(S): 8.6 TABLET ORAL at 21:16

## 2020-09-12 RX ADMIN — Medication 650 MILLIGRAM(S): at 13:00

## 2020-09-12 RX ADMIN — Medication 650 MILLIGRAM(S): at 12:00

## 2020-09-12 RX ADMIN — ATORVASTATIN CALCIUM 10 MILLIGRAM(S): 80 TABLET, FILM COATED ORAL at 21:16

## 2020-09-12 RX ADMIN — Medication 650 MILLIGRAM(S): at 17:11

## 2020-09-12 RX ADMIN — Medication 25 MILLIGRAM(S): at 05:00

## 2020-09-12 NOTE — DOWNTIME INTERRUPTION NOTE - WHICH MANUAL FORMS INITIATED?
[facility] eMAR Downtime. See paper records for clinical information written during [facility] downtime.

## 2020-09-13 RX ORDER — TRAMADOL HYDROCHLORIDE 50 MG/1
25 TABLET ORAL EVERY 4 HOURS
Refills: 0 | Status: DISCONTINUED | OUTPATIENT
Start: 2020-09-13 | End: 2020-09-15

## 2020-09-13 RX ORDER — SODIUM CHLORIDE 9 MG/ML
1000 INJECTION INTRAMUSCULAR; INTRAVENOUS; SUBCUTANEOUS
Refills: 0 | Status: DISCONTINUED | OUTPATIENT
Start: 2020-09-13 | End: 2020-09-15

## 2020-09-13 RX ORDER — TRAMADOL HYDROCHLORIDE 50 MG/1
50 TABLET ORAL EVERY 4 HOURS
Refills: 0 | Status: DISCONTINUED | OUTPATIENT
Start: 2020-09-13 | End: 2020-09-15

## 2020-09-13 RX ADMIN — TRAMADOL HYDROCHLORIDE 25 MILLIGRAM(S): 50 TABLET ORAL at 14:36

## 2020-09-13 RX ADMIN — Medication 650 MILLIGRAM(S): at 23:11

## 2020-09-13 RX ADMIN — Medication 650 MILLIGRAM(S): at 18:51

## 2020-09-13 RX ADMIN — SENNA PLUS 2 TABLET(S): 8.6 TABLET ORAL at 21:35

## 2020-09-13 RX ADMIN — TRAMADOL HYDROCHLORIDE 25 MILLIGRAM(S): 50 TABLET ORAL at 15:36

## 2020-09-13 RX ADMIN — TRAMADOL HYDROCHLORIDE 25 MILLIGRAM(S): 50 TABLET ORAL at 19:43

## 2020-09-13 RX ADMIN — TRAMADOL HYDROCHLORIDE 25 MILLIGRAM(S): 50 TABLET ORAL at 18:43

## 2020-09-13 RX ADMIN — Medication 25 MILLIGRAM(S): at 05:39

## 2020-09-13 RX ADMIN — Medication 650 MILLIGRAM(S): at 05:39

## 2020-09-13 RX ADMIN — Medication 650 MILLIGRAM(S): at 12:20

## 2020-09-13 RX ADMIN — Medication 650 MILLIGRAM(S): at 17:51

## 2020-09-13 RX ADMIN — Medication 650 MILLIGRAM(S): at 06:39

## 2020-09-13 RX ADMIN — Medication 650 MILLIGRAM(S): at 11:20

## 2020-09-13 RX ADMIN — ATORVASTATIN CALCIUM 10 MILLIGRAM(S): 80 TABLET, FILM COATED ORAL at 21:35

## 2020-09-13 RX ADMIN — TRAMADOL HYDROCHLORIDE 25 MILLIGRAM(S): 50 TABLET ORAL at 23:13

## 2020-09-13 RX ADMIN — Medication 650 MILLIGRAM(S): at 00:53

## 2020-09-13 NOTE — PROGRESS NOTE ADULT - SUBJECTIVE AND OBJECTIVE BOX
Ortho Note    Pt was confused and disoriented last night, stable, alert and oriented this AM, comfortable without complaints, pain controlled  Denies CP, SOB, N/V, numbness/tingling     Vital Signs Last 24 Hrs  T(C): 36.7 (09-13-20 @ 05:41), Max: 36.7 (09-13-20 @ 05:41)  T(F): 98.1 (09-13-20 @ 05:41), Max: 98.1 (09-13-20 @ 05:41)  HR: 69 (09-13-20 @ 05:41) (69 - 69)  BP: 154/87 (09-13-20 @ 05:41) (154/87 - 154/87)  BP(mean): --  RR: 17 (09-13-20 @ 05:41) (17 - 17)  SpO2: 95% (09-13-20 @ 05:41) (95% - 95%)    General: Awake and alert, NAD  Back of neck DSG C/D/I  HVx2 holding suction    strength intact bilateral upper extremities; firing biceps/triceps bilateral upper extremities; radial pulses palpable bilateral upper extremities   Sensation intact to bilateral lower extremities. Skin well perfused. Firing  EHL/FHL/TA/GS bilateral lower extremities       Drain output:    09-12-20 @ 07:01  -  09-13-20 @ 07:00  --------------------------------------------------------  OUT:    Accordian (mL): 25 mL    Accordian (mL): 25 mL  Total OUT: 50 mL      A/P: 79yMale s/p C3-7 laminectomy, L1-L3, L5-S1 laminotomy  - Stable  - Pain Control  - DVT ppx: SCDs  - PT, WBS: WBAT  - drain outputs noted, continue and monitor output  - dispo home when clears PT      Ortho Pager [phone]

## 2020-09-14 RX ADMIN — TRAMADOL HYDROCHLORIDE 25 MILLIGRAM(S): 50 TABLET ORAL at 00:13

## 2020-09-14 RX ADMIN — Medication 650 MILLIGRAM(S): at 00:11

## 2020-09-14 RX ADMIN — Medication 650 MILLIGRAM(S): at 06:02

## 2020-09-14 RX ADMIN — ATORVASTATIN CALCIUM 10 MILLIGRAM(S): 80 TABLET, FILM COATED ORAL at 21:39

## 2020-09-14 RX ADMIN — Medication 650 MILLIGRAM(S): at 11:14

## 2020-09-14 RX ADMIN — Medication 25 MILLIGRAM(S): at 05:03

## 2020-09-14 RX ADMIN — Medication 650 MILLIGRAM(S): at 12:14

## 2020-09-14 RX ADMIN — Medication 650 MILLIGRAM(S): at 18:28

## 2020-09-14 RX ADMIN — Medication 650 MILLIGRAM(S): at 05:02

## 2020-09-14 NOTE — PROGRESS NOTE ADULT - SUBJECTIVE AND OBJECTIVE BOX
POST OPERATIVE DAY #5 C3-C7 lami, L1-L3, L5-S1 laminotomy  SUBJECTIVE: Patient seen and examined.  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   Denies chest pain/SOB/dizziness/n/v/HA   Pain well controlled.       OBJECTIVE:     Vital Signs Last 24 Hrs  T(C): 36.8 (14 Sep 2020 09:48), Max: 36.9 (13 Sep 2020 20:28)  T(F): 98.2 (14 Sep 2020 09:48), Max: 98.5 (13 Sep 2020 20:28)  HR: 64 (14 Sep 2020 09:48) (64 - 74)  BP: 141/88 (14 Sep 2020 09:48) (124/75 - 153/82)  BP(mean): --  RR: 18 (14 Sep 2020 09:48) (16 - 18)  SpO2: 95% (14 Sep 2020 09:48) (94% - 97%)    PE: Dressing: clean/dry/intact, HV x 2 in place.   BUE:Sensation: intact to light touch to patient's baseline M/R/U/msk/ax nerves         Motor exam:  firing ain/pin/u nerves intact. Wrist flex/ext intact.  BUE Shoulder ROM full flexion, extension, IR/ER with some difficulty initiating IR/ER BUE. Wrist flex/ext. Negative belly press, empty can.    Skin warm, well-perfused; capillary refill brisk, radial pulse palpable.  BLE: Sensation intact and equal BLE. EHL/TA/GS/FHL 5/5 BLE. Skin warm and well perfused. Cap refill brisk. Dp palpable BLE.           ASSESSMENT AND PLAN: POD 5 C3-C7 lami, L1-L3, L5-S1 laminotomy   1. Stable. Drains to be removed now. PT notes improvement from yesterday, walked to door from bed 6 times. Seen with Dr. George who agrees with plan. No need to further eval neck or shoulder pain as pain has improved and appears consistent with Outpatient shoulder pathology.   2. Analgesic pain control  3. DVT prophylaxis: SCDs  4. Weight Bearing Status:  WBAT   5. Disposition: Home pending PT clearance.

## 2020-09-14 NOTE — PROGRESS NOTE ADULT - SUBJECTIVE AND OBJECTIVE BOX
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  Pain controlled  Denies CP, SOB, N/V, numbness/tingling     Vital Signs Last 24 Hrs  T(C): 36.4 (14 Sep 2020 05:06), Max: 36.9 (13 Sep 2020 09:36)  T(F): 97.5 (14 Sep 2020 05:06), Max: 98.5 (13 Sep 2020 20:28)  HR: 66 (14 Sep 2020 05:06) (66 - 74)  BP: 153/82 (14 Sep 2020 05:06) (124/75 - 153/82)  BP(mean): --  RR: 16 (14 Sep 2020 05:06) (16 - 18)  SpO2: 95% (14 Sep 2020 05:06) (94% - 97%)    General: Awake and alert, NAD  Back of neck DSG C/D/I  HVx2 holding suction    strength intact bilateral upper extremities; firing biceps/triceps bilateral upper extremities; radial pulses palpable bilateral upper extremities   Sensation intact to bilateral lower extremities. Skin well perfused. Firing  EHL/FHL/TA/GS bilateral lower extremities         A/P: 79yMale s/p C3-7 laminectomy, L1-L3, L5-S1 laminotomy  - Stable  - Pain Control  - DVT ppx: SCDs  - PT, WBS: WBAT  - drain outputs noted, continue and monitor output  - f/u CT scan today for neck pain   - dispo home when clears PT      Ortho Pager [phone]

## 2020-09-15 ENCOUNTER — TRANSCRIPTION ENCOUNTER (OUTPATIENT)
Age: 80
End: 2020-09-15

## 2020-09-15 VITALS
HEART RATE: 73 BPM | OXYGEN SATURATION: 99 % | SYSTOLIC BLOOD PRESSURE: 146 MMHG | TEMPERATURE: 98 F | DIASTOLIC BLOOD PRESSURE: 85 MMHG | RESPIRATION RATE: 13 BRPM

## 2020-09-15 PROCEDURE — 97535 SELF CARE MNGMENT TRAINING: CPT

## 2020-09-15 PROCEDURE — 86709 HEPATITIS A IGM ANTIBODY: CPT

## 2020-09-15 PROCEDURE — 97116 GAIT TRAINING THERAPY: CPT

## 2020-09-15 PROCEDURE — 76000 FLUOROSCOPY <1 HR PHYS/QHP: CPT

## 2020-09-15 PROCEDURE — C1889: CPT

## 2020-09-15 PROCEDURE — 99223 1ST HOSP IP/OBS HIGH 75: CPT

## 2020-09-15 PROCEDURE — 86706 HEP B SURFACE ANTIBODY: CPT

## 2020-09-15 PROCEDURE — 97162 PT EVAL MOD COMPLEX 30 MIN: CPT

## 2020-09-15 PROCEDURE — 86803 HEPATITIS C AB TEST: CPT

## 2020-09-15 PROCEDURE — 86850 RBC ANTIBODY SCREEN: CPT

## 2020-09-15 PROCEDURE — 97161 PT EVAL LOW COMPLEX 20 MIN: CPT

## 2020-09-15 PROCEDURE — 87340 HEPATITIS B SURFACE AG IA: CPT

## 2020-09-15 PROCEDURE — 87389 HIV-1 AG W/HIV-1&-2 AB AG IA: CPT

## 2020-09-15 PROCEDURE — 86705 HEP B CORE ANTIBODY IGM: CPT

## 2020-09-15 PROCEDURE — 95940 IONM IN OPERATNG ROOM 15 MIN: CPT

## 2020-09-15 PROCEDURE — 86704 HEP B CORE ANTIBODY TOTAL: CPT

## 2020-09-15 PROCEDURE — 81001 URINALYSIS AUTO W/SCOPE: CPT

## 2020-09-15 PROCEDURE — 85025 COMPLETE CBC W/AUTO DIFF WBC: CPT

## 2020-09-15 PROCEDURE — 86901 BLOOD TYPING SEROLOGIC RH(D): CPT

## 2020-09-15 PROCEDURE — 80048 BASIC METABOLIC PNL TOTAL CA: CPT

## 2020-09-15 PROCEDURE — 36415 COLL VENOUS BLD VENIPUNCTURE: CPT

## 2020-09-15 RX ORDER — TRAMADOL HYDROCHLORIDE 50 MG/1
0.5 TABLET ORAL
Qty: 21 | Refills: 0
Start: 2020-09-15 | End: 2020-09-21

## 2020-09-15 RX ORDER — SENNA PLUS 8.6 MG/1
2 TABLET ORAL
Qty: 0 | Refills: 0 | DISCHARGE
Start: 2020-09-15

## 2020-09-15 RX ORDER — ACETAMINOPHEN 500 MG
2 TABLET ORAL
Qty: 0 | Refills: 0 | DISCHARGE
Start: 2020-09-15

## 2020-09-15 RX ADMIN — Medication 25 MILLIGRAM(S): at 05:07

## 2020-09-15 RX ADMIN — Medication 650 MILLIGRAM(S): at 00:29

## 2020-09-15 RX ADMIN — TRAMADOL HYDROCHLORIDE 25 MILLIGRAM(S): 50 TABLET ORAL at 00:37

## 2020-09-15 RX ADMIN — TRAMADOL HYDROCHLORIDE 25 MILLIGRAM(S): 50 TABLET ORAL at 14:43

## 2020-09-15 RX ADMIN — Medication 650 MILLIGRAM(S): at 12:08

## 2020-09-15 RX ADMIN — Medication 650 MILLIGRAM(S): at 11:08

## 2020-09-15 RX ADMIN — Medication 650 MILLIGRAM(S): at 05:07

## 2020-09-15 RX ADMIN — TRAMADOL HYDROCHLORIDE 25 MILLIGRAM(S): 50 TABLET ORAL at 15:43

## 2020-09-15 RX ADMIN — TRAMADOL HYDROCHLORIDE 25 MILLIGRAM(S): 50 TABLET ORAL at 01:37

## 2020-09-15 RX ADMIN — Medication 650 MILLIGRAM(S): at 06:07

## 2020-09-15 RX ADMIN — Medication 650 MILLIGRAM(S): at 01:29

## 2020-09-15 NOTE — CONSULT NOTE ADULT - SUBJECTIVE AND OBJECTIVE BOX
LAZ LOCK  79y  Male    Patient is a 79y old  Male who presents with a chief complaint of neck pain, low back pain (15 Sep 2020 11:59)      HPI:  79M c/o neck and low back pain x chronic, worsening in the last year s/p hip replacement surgery. The complains mostly of low back pain which radiates to his right glute and minimal neck pain as well as a "loss of balance" s/p three falls. The patient ambulates with assistance of a cane. Failed conservative therapies for his symptoms. Takes celebrex for his pain. Denies hx of DVT.         Present for elective Cervical Laminectomy C3-C7, Lumbar Laminectomy L2-L3, L5-S1 (08 Sep 2020 15:06)    Patient is s/p C3-C7 laminectomy and L1-L3/L5-S1 laminotomy-uneventful procedures.  Seen by me early this afternoon    PAST MEDICAL & SURGICAL HISTORY:  HTN (hypertension)    High cholesterol    Broken shoulder, left, closed, initial encounter  greater tuberosity fx    Meniscus degeneration, right    Hernia    Prostate cancer  seeds implants    History of hip replacement, total, left  2019    Elective surgery  right meniscus repair    H/O hernia repair    History of cholecystectomy        Home Medications:  acetaminophen 325 mg oral tablet: 2 tab(s) orally every 6 hours, As Needed for mild pain or fever 100.3 or higher  DO not exceed 4000mg/day (15 Sep 2020 10:47)  Aspir 81 oral delayed release tablet: 1 tab(s) orally once a day (09 Sep 2020 07:02)  atorvastatin 10 mg oral tablet: 1 tab(s) orally once a day (at bedtime) (09 Sep 2020 07:02)  Imodium:  (09 Sep 2020 07:02)  metoprolol tartrate 25 mg oral tablet: 1 tab(s) orally once a day (at bedtime) (09 Sep 2020 07:02)  senna oral tablet: 2 tab(s) orally once a day (at bedtime) (15 Sep 2020 10:47)      79y    FAMILY HISTORY:      Marital Status:  (   )    (   ) Single    (   )    (  )   Lives with: (  ) alone  (  ) children   (  ) spouse   (  ) parents  (  ) other  Recent Travel: No recent travel  Occupation:    Substance Use (street drugs): ( x ) never used  (  ) other:  Tobacco Usage:  ( x  ) never smoked   (   ) former smoker   (   ) current smoker  (     ) pack year  Alcohol Usage: None       MEDICATIONS  (STANDING):  acetaminophen   Tablet .. 650 milliGRAM(s) Oral every 6 hours  atorvastatin 10 milliGRAM(s) Oral at bedtime  influenza  Vaccine (HIGH DOSE) 0.7 milliLiter(s) IntraMuscular once  metoprolol succinate ER 25 milliGRAM(s) Oral daily  senna 2 Tablet(s) Oral at bedtime    MEDICATIONS  (PRN):  HYDROmorphone  Injectable 0.5 milliGRAM(s) IV Push every 4 hours PRN breakthrough pain  magnesium hydroxide Suspension 30 milliLiter(s) Oral every 12 hours PRN Constipation  ondansetron Injectable 4 milliGRAM(s) IV Push every 6 hours PRN Nausea  traMADol 25 milliGRAM(s) Oral every 4 hours PRN Moderate Pain (4 - 6)  traMADol 50 milliGRAM(s) Oral every 4 hours PRN Severe Pain (7 - 10)    REVIEW OF SYSTEMS:  As HPI otherwise reviewed and negative    Vital Signs Last 24 Hrs  T(C): 36.6 (15 Sep 2020 09:14), Max: 37.4 (14 Sep 2020 20:29)  T(F): 97.9 (15 Sep 2020 09:14), Max: 99.4 (14 Sep 2020 20:29)  HR: 73 (15 Sep 2020 09:14) (70 - 89)  BP: 146/85 (15 Sep 2020 09:14) (146/85 - 151/75)  BP(mean): --  RR: 13 (15 Sep 2020 09:14) (13 - 17)  SpO2: 99% (15 Sep 2020 09:14) (96% - 99%)    PHYSICAL EXAM:  GENERAL: NAD, well-groomed, well-developed  HEAD:  Atraumatic, Normocephalic  EYES: EOMI, PERRLA, conjunctiva and sclera clear  ENMT: No tonsillar erythema, exudates, or enlargement; Moist mucous membranes, Good dentition, No lesions  NECK: Supple, No JVD, Normal thyroid  NERVOUS SYSTEM:  Alert & Oriented X3, Good concentration; Motor Strength 5/5 B/L upper and lower extremities; DTRs 2+ intact and symmetric  CHEST/LUNG: Clear to percussion bilaterally; No rales, rhonchi, wheezing, or rubs  HEART: Regular rate and rhythm; No murmurs, rubs, or gallops  ABDOMEN: Soft, Nontender, Nondistended; Bowel sounds present  EXTREMITIES:  2+ Peripheral Pulses, No clubbing, cyanosis, or edema  LYMPH: No lymphadenopathy noted  SKIN: No rashes or lesions    Consultant(s) Notes Reviewed:  [x ] YES  [ ] NO  Care Discussed with Consultants/Other Providers [ x] YES  [ ] NO    LABS:              CAPILLARY BLOOD GLUCOSE            RADIOLOGY & ADDITIONAL TESTS:     LAZ LOCK  79y  Male    Patient is a 79y old  Male who presents with a chief complaint of neck pain, low back pain (15 Sep 2020 11:59)      HPI:  79M c/o neck and low back pain x chronic, worsening in the last year s/p hip replacement surgery. The complains mostly of low back pain which radiates to his right glute and minimal neck pain as well as a "loss of balance" s/p three falls. The patient ambulates with assistance of a cane. Failed conservative therapies for his symptoms. Takes celebrex for his pain. Denies hx of DVT.     Present for elective Cervical Laminectomy C3-C7, Lumbar Laminectomy L2-L3, L5-S1 (08 Sep 2020 15:06)    Patient is s/p C3-C7 laminectomy and L1-L3/L5-S1 laminotomy-uneventful procedures.  Seen by me early this afternoon, eager to going home today. States that pain is overall under control. +BM. Using IS. No other complaints. Good appetite. Denies any numbness or tingling sensation of arms or legs. Occasional left shoulder pain which is chronic.    PAST MEDICAL & SURGICAL HISTORY:  HTN (hypertension)    High cholesterol    Broken shoulder, left, closed, initial encounter  greater tuberosity fx    Meniscus degeneration, right    Hernia    Prostate cancer  seeds implants    History of hip replacement, total, left  2019    Elective surgery  right meniscus repair    H/O hernia repair    History of cholecystectomy        Home Medications:  acetaminophen 325 mg oral tablet: 2 tab(s) orally every 6 hours, As Needed for mild pain or fever 100.3 or higher  DO not exceed 4000mg/day (15 Sep 2020 10:47)  Aspir 81 oral delayed release tablet: 1 tab(s) orally once a day (09 Sep 2020 07:02)  atorvastatin 10 mg oral tablet: 1 tab(s) orally once a day (at bedtime) (09 Sep 2020 07:02)  Imodium:  (09 Sep 2020 07:02)  metoprolol tartrate 25 mg oral tablet: 1 tab(s) orally once a day (at bedtime) (09 Sep 2020 07:02)  senna oral tablet: 2 tab(s) orally once a day (at bedtime) (15 Sep 2020 10:47)      79y    FAMILY HISTORY:  Father had prostate cancer  Sister had breast cancer    Marital Status:  (   )    (   ) Single    (   )    (  )   Lives with: (  ) alone  (  ) children   ( X ) spouse   (  ) parents  (  ) other  Recent Travel: No recent travel  Occupation:    Substance Use (street drugs): ( x ) never used  (  ) other:  Tobacco Usage:  ( x  ) never smoked   (   ) former smoker   (   ) current smoker  (     ) pack year  Alcohol Usage: None       MEDICATIONS  (STANDING):  acetaminophen   Tablet .. 650 milliGRAM(s) Oral every 6 hours  atorvastatin 10 milliGRAM(s) Oral at bedtime  influenza  Vaccine (HIGH DOSE) 0.7 milliLiter(s) IntraMuscular once  metoprolol succinate ER 25 milliGRAM(s) Oral daily  senna 2 Tablet(s) Oral at bedtime    MEDICATIONS  (PRN):  HYDROmorphone  Injectable 0.5 milliGRAM(s) IV Push every 4 hours PRN breakthrough pain  magnesium hydroxide Suspension 30 milliLiter(s) Oral every 12 hours PRN Constipation  ondansetron Injectable 4 milliGRAM(s) IV Push every 6 hours PRN Nausea  traMADol 25 milliGRAM(s) Oral every 4 hours PRN Moderate Pain (4 - 6)  traMADol 50 milliGRAM(s) Oral every 4 hours PRN Severe Pain (7 - 10)    REVIEW OF SYSTEMS:  As HPI otherwise reviewed and negative    Vital Signs Last 24 Hrs  T(C): 36.6 (15 Sep 2020 09:14), Max: 37.4 (14 Sep 2020 20:29)  T(F): 97.9 (15 Sep 2020 09:14), Max: 99.4 (14 Sep 2020 20:29)  HR: 73 (15 Sep 2020 09:14) (70 - 89)  BP: 146/85 (15 Sep 2020 09:14) (146/85 - 151/75)  BP(mean): --  RR: 13 (15 Sep 2020 09:14) (13 - 17)  SpO2: 99% (15 Sep 2020 09:14) (96% - 99%)    PHYSICAL EXAM:  GENERAL: NAD, well-groomed, well-developed  HEAD:  Atraumatic, Normocephalic  EYES: EOMI, PERRLA, conjunctiva and sclera clear  ENMT: No tonsillar erythema, exudates, or enlargement; Moist mucous membranes, Good dentition, No lesions  NECK: Supple, No JVD, Normal thyroid  NERVOUS SYSTEM:  Alert & Oriented X3, Good concentration  CHEST/LUNG: Clear to percussion bilaterally; No rales, rhonchi, wheezing, or rubs  HEART: Regular rate and rhythm; No murmurs, rubs, or gallops  ABDOMEN: Soft, Nontender, Nondistended; Bowel sounds present  EXTREMITIES:  2+ Peripheral Pulses, No clubbing, cyanosis, or edema  BACK: dressings on cervical and lumbar area C/D/I  LYMPH: No lymphadenopathy noted  SKIN: No rashes or lesions    Consultant(s) Notes Reviewed:  [x ] YES  [ ] NO  Care Discussed with Consultants/Other Providers [ x] YES  [ ] NO    LABS:              CAPILLARY BLOOD GLUCOSE            RADIOLOGY & ADDITIONAL TESTS:

## 2020-09-15 NOTE — CONSULT NOTE ADULT - ASSESSMENT
79 year old male with,    # S/p C3-C7 laminectomy/L1-L3 & L5-S1 laminotomy: care per Ortho. C/w pain meds/bowel regimen. Encouraged IS use. DVT PPx with SCD's. PT follow up --> HPT.    # HTN: BP at acceptable range, probably slightly elevated due to pain. C/w metoprolol.    # Anemia: likely post-op, stable.    # Hyperlipidemia: c/w statin    Thanks for the consult. D/w Orthopedics, stable for discharge home today.  Patient has a follow up appointment with his PMD in 2 weeks.

## 2020-09-15 NOTE — DISCHARGE NOTE NURSING/CASE MANAGEMENT/SOCIAL WORK - PATIENT PORTAL LINK FT
You can access the FollowMyHealth Patient Portal offered by [facility] by registering at the following website: [email or web address]. By joining [facility]’s FollowMyHealth portal, you will also be able to view your health information using other applications (apps) compatible with our system.

## 2020-09-21 DIAGNOSIS — E78.5 HYPERLIPIDEMIA, UNSPECIFIED: ICD-10-CM

## 2020-09-21 DIAGNOSIS — Z92.3 PERSONAL HISTORY OF IRRADIATION: ICD-10-CM

## 2020-09-21 DIAGNOSIS — C61 MALIGNANT NEOPLASM OF PROSTATE: ICD-10-CM

## 2020-09-21 DIAGNOSIS — Z96.642 PRESENCE OF LEFT ARTIFICIAL HIP JOINT: ICD-10-CM

## 2020-09-21 DIAGNOSIS — G95.20 UNSPECIFIED CORD COMPRESSION: ICD-10-CM

## 2020-09-21 DIAGNOSIS — M48.02 SPINAL STENOSIS, CERVICAL REGION: ICD-10-CM

## 2020-09-21 DIAGNOSIS — G95.89 OTHER SPECIFIED DISEASES OF SPINAL CORD: ICD-10-CM

## 2020-09-21 DIAGNOSIS — M48.07 SPINAL STENOSIS, LUMBOSACRAL REGION: ICD-10-CM

## 2020-09-21 DIAGNOSIS — I10 ESSENTIAL (PRIMARY) HYPERTENSION: ICD-10-CM

## 2021-11-09 NOTE — OCCUPATIONAL THERAPY INITIAL EVALUATION ADULT - LEVEL OF INDEPENDENCE: SIT/STAND, REHAB EVAL
Left detailed  msg requesting return call to schedule. moderate assist (50% patients effort) 01-Dec-2018 01:51

## 2022-08-15 NOTE — PHYSICAL THERAPY INITIAL EVALUATION ADULT - CRITERIA FOR SKILLED THERAPEUTIC INTERVENTIONS
Subjective:      Patient ID: Destini Augustine is a 88 y.o. female.    Chief Complaint: Follow-up    HPI   Here today for her 6 month follow up for her medical problems.   Up to date with DR. Benz.   No chest pain, shortness of breath, or heart palpitations.   Normal bowel movements.   Up to date with cardiology.   See's derm once a year. Left groin cyst burst on its own and then derm took the rest out in office. No issues since.   Weight is stable.     Patient Active Problem List   Diagnosis    Osteopenia    Nonexudative senile macular degeneration of retina    Atherosclerosis of aorta    Primary open angle glaucoma of both eyes, moderate stage    Pulmonary heart disease    Atrial fibrillation, permanent    Pseudophakia of both eyes    Anticoagulated    Chronic pain of right knee    Osteoarthritis of right knee    Scapholunate ligament injury with no instability    Arthritis of left wrist    Left wrist pain    Abnormal ECG    Pulmonary hypertension    Nonrheumatic tricuspid valve regurgitation    Nonrheumatic mitral valve regurgitation       Current Outpatient Medications:     alendronate (FOSAMAX) 70 MG tablet, TAKE 1 TAB WEEKLY IN THE MORNING WITH FULL GLASS OF WATER ON EMPTY STOMACH.DO NOT LIE DOWN FOR AT LEAST 30 MINUTES AFTER, Disp: 12 tablet, Rfl: 4    apixaban (ELIQUIS) 2.5 mg Tab, Take 1 tablet (2.5 mg total) by mouth 2 (two) times daily., Disp: 180 tablet, Rfl: 3    BIOTIN ORAL, Take 1 tablet by mouth 2 (two) times daily. Once daily per patient, Disp: , Rfl:     CALCIUM CARBONATE/VITAMIN D3 (CALCIUM 600 + D,3, ORAL), Take 1 capsule by mouth 2 (two) times daily., Disp: , Rfl:     DOCOSAHEXANOIC ACID/EPA (FISH OIL ORAL), Take 1 capsule by mouth 2 (two) times daily. , Disp: , Rfl:     GLUCOSAMINE HCL/CHONDRO COBB A (GLUCOSAMINE-CHONDROITIN ORAL), Take 1 tablet by mouth 2 (two) times daily. , Disp: , Rfl:     latanoprost 0.005 % ophthalmic solution, INSTILL 1 DROP INTO BOTH EYES EVERY  NIGHT, Disp: 3 mL, Rfl: 12    metoprolol succinate (TOPROL-XL) 25 MG 24 hr tablet, Take 1 tablet (25 mg total) by mouth once daily., Disp: 90 tablet, Rfl: 4    multivitamin (THERAGRAN) per tablet, Take 1 tablet by mouth once daily., Disp: , Rfl:     timolol maleate 0.5% (TIMOPTIC) 0.5 % Drop, Place 1 drop into the right eye every morning., Disp: 10 mL, Rfl: 4    turmeric root extract 500 mg Cap, Take by mouth. Once daily per patient, Disp: , Rfl:     Health Maintenance Due   Topic Date Due    COVID-19 Vaccine (4 - Booster for Pfizer series) 03/12/2022    TETANUS VACCINE  08/01/2022       Review of Systems   Constitutional: Negative for activity change, appetite change, chills, diaphoresis, fatigue, fever and unexpected weight change.   HENT: Negative.  Negative for congestion, hearing loss, postnasal drip, rhinorrhea, sore throat, trouble swallowing and voice change.    Eyes: Negative.  Negative for visual disturbance.   Respiratory: Negative.  Negative for cough, choking, chest tightness and shortness of breath.    Cardiovascular: Negative for chest pain, palpitations and leg swelling.   Gastrointestinal: Negative for abdominal distention, abdominal pain, blood in stool, constipation, diarrhea, nausea and vomiting.   Endocrine: Negative for cold intolerance, heat intolerance, polydipsia and polyuria.   Genitourinary: Negative.  Negative for difficulty urinating and frequency.   Musculoskeletal: Negative for arthralgias, back pain, gait problem, joint swelling and myalgias.   Skin: Negative for color change, pallor, rash and wound.   Neurological: Negative for dizziness, tremors, weakness, light-headedness, numbness and headaches.   Hematological: Negative for adenopathy.   Psychiatric/Behavioral: Negative for behavioral problems, confusion, self-injury, sleep disturbance and suicidal ideas. The patient is not nervous/anxious.      Objective:   /70 (BP Location: Right arm, Patient Position: Sitting, BP  "Method: Medium (Manual))   Pulse 104   Temp 97.1 °F (36.2 °C) (Tympanic)   Ht 5' 7" (1.702 m)   Wt 59.5 kg (131 lb 2.8 oz)   SpO2 95%   BMI 20.54 kg/m²     Physical Exam  Vitals and nursing note reviewed.   Constitutional:       General: She is not in acute distress.     Appearance: Normal appearance. She is well-developed. She is not ill-appearing, toxic-appearing or diaphoretic.   HENT:      Head: Normocephalic and atraumatic.      Right Ear: External ear normal.      Left Ear: External ear normal.      Nose: Nose normal.   Eyes:      General: No scleral icterus.        Right eye: No discharge.         Left eye: No discharge.      Conjunctiva/sclera: Conjunctivae normal.      Pupils: Pupils are equal, round, and reactive to light.   Neck:      Thyroid: No thyromegaly.      Vascular: Normal carotid pulses. No carotid bruit or JVD.   Cardiovascular:      Rate and Rhythm: Normal rate and regular rhythm.      Heart sounds: Normal heart sounds. No murmur heard.    No friction rub. No gallop.   Pulmonary:      Effort: Pulmonary effort is normal. No accessory muscle usage or respiratory distress.      Breath sounds: Normal breath sounds. No wheezing or rales.   Chest:      Chest wall: No tenderness.   Abdominal:      General: Bowel sounds are normal. There is no distension.      Palpations: Abdomen is soft. There is no mass.      Tenderness: There is no abdominal tenderness. There is no guarding or rebound.   Musculoskeletal:         General: No tenderness or deformity. Normal range of motion.      Cervical back: Normal range of motion and neck supple. No edema.   Lymphadenopathy:      Cervical: No cervical adenopathy.   Skin:     General: Skin is warm and dry.      Coloration: Skin is not pale.      Findings: No erythema or rash.      Nails: There is no clubbing.   Neurological:      Mental Status: She is alert and oriented to person, place, and time.      Cranial Nerves: No cranial nerve deficit.      Sensory: No " sensory deficit.      Motor: No abnormal muscle tone.      Coordination: Coordination normal.      Deep Tendon Reflexes: Reflexes are normal and symmetric. Reflexes normal.   Psychiatric:         Mood and Affect: Mood normal.         Behavior: Behavior normal. Behavior is cooperative.         Thought Content: Thought content normal.         Judgment: Judgment normal.         Assessment:     1. Atrial fibrillation, permanent    2. Osteopenia of multiple sites    3. Atherosclerosis of aorta    4. Pulmonary heart disease    5. Pulmonary hypertension      Plan:   Atrial fibrillation, permanent  -     CBC Auto Differential; Future; Expected date: 08/15/2023  -     Comprehensive Metabolic Panel; Future; Expected date: 08/15/2023  -     Lipid Panel; Future; Expected date: 08/15/2023    Osteopenia of multiple sites  -stable on fosamax    Atherosclerosis of aorta  -     Lipid Panel; Future; Expected date: 08/15/2023  -stable on eliquis and metoprolol    Pulmonary heart disease  -     Lipid Panel; Future; Expected date: 08/15/2023    Pulmonary hypertension  -     Lipid Panel; Future; Expected date: 08/15/2023  -labs in 1 year    Follow up in about 1 year (around 8/15/2023), or if symptoms worsen or fail to improve.         impairments found/therapy frequency/anticipated discharge recommendation/rehab potential

## 2023-03-15 ENCOUNTER — APPOINTMENT (OUTPATIENT)
Dept: GERIATRICS | Facility: HOME HEALTH | Age: 83
End: 2023-03-15
Payer: MEDICARE

## 2023-03-15 VITALS
SYSTOLIC BLOOD PRESSURE: 130 MMHG | OXYGEN SATURATION: 97 % | DIASTOLIC BLOOD PRESSURE: 80 MMHG | HEIGHT: 71 IN | BODY MASS INDEX: 24.22 KG/M2 | WEIGHT: 173 LBS | HEART RATE: 63 BPM

## 2023-03-15 VITALS — DIASTOLIC BLOOD PRESSURE: 80 MMHG | SYSTOLIC BLOOD PRESSURE: 130 MMHG

## 2023-03-15 PROCEDURE — 99344 HOME/RES VST NEW MOD MDM 60: CPT

## 2023-03-15 RX ORDER — ATORVASTATIN CALCIUM 10 MG/1
10 TABLET, FILM COATED ORAL
Qty: 90 | Refills: 3 | Status: ACTIVE | COMMUNITY
Start: 2023-03-15

## 2023-03-15 RX ORDER — VIBEGRON 75 MG/1
75 TABLET, FILM COATED ORAL DAILY
Refills: 0 | Status: ACTIVE | COMMUNITY
Start: 2023-03-15

## 2023-03-15 RX ORDER — UBIDECARENONE/VIT E ACET 100MG-5
25 MCG CAPSULE ORAL
Qty: 90 | Refills: 3 | Status: ACTIVE | COMMUNITY
Start: 2023-03-15

## 2023-03-15 RX ORDER — NORMAL SALT TABLETS 1 G/G
1 TABLET ORAL
Qty: 30 | Refills: 5 | Status: ACTIVE | COMMUNITY
Start: 2023-03-15

## 2023-03-15 RX ORDER — SILODOSIN 8 MG/1
8 CAPSULE ORAL
Refills: 0 | Status: ACTIVE | COMMUNITY
Start: 2023-03-15

## 2023-03-20 ENCOUNTER — LABORATORY RESULT (OUTPATIENT)
Age: 83
End: 2023-03-20

## 2023-03-21 ENCOUNTER — LABORATORY RESULT (OUTPATIENT)
Age: 83
End: 2023-03-21

## 2023-05-25 ENCOUNTER — APPOINTMENT (OUTPATIENT)
Dept: GERIATRICS | Facility: HOME HEALTH | Age: 83
End: 2023-05-25
Payer: MEDICARE

## 2023-05-25 VITALS
SYSTOLIC BLOOD PRESSURE: 120 MMHG | HEIGHT: 70 IN | DIASTOLIC BLOOD PRESSURE: 80 MMHG | OXYGEN SATURATION: 97 % | HEART RATE: 83 BPM | WEIGHT: 165 LBS | BODY MASS INDEX: 23.62 KG/M2

## 2023-05-25 DIAGNOSIS — S32.010A WEDGE COMPRESSION FRACTURE OF FIRST LUMBAR VERTEBRA, INITIAL ENCOUNTER FOR CLOSED FRACTURE: ICD-10-CM

## 2023-05-25 DIAGNOSIS — E78.00 PURE HYPERCHOLESTEROLEMIA, UNSPECIFIED: ICD-10-CM

## 2023-05-25 DIAGNOSIS — M81.0 AGE-RELATED OSTEOPOROSIS W/OUT CURRENT PATHOLOGICAL FRACTURE: ICD-10-CM

## 2023-05-25 DIAGNOSIS — R15.9 FULL INCONTINENCE OF FECES: ICD-10-CM

## 2023-05-25 PROCEDURE — 99349 HOME/RES VST EST MOD MDM 40: CPT

## 2023-05-25 NOTE — REVIEW OF SYSTEMS
[As Noted in HPI] : as noted in HPI [Incontinence] : incontinence [Limb Weakness] : limb weakness [Difficulty Walking] : difficulty walking [Negative] : Heme/Lymph [FreeTextEntry7] : fecal incontinence - richard -

## 2023-05-25 NOTE — PHYSICAL EXAM
[No Respiratory Distress] : no respiratory distress [No Acc Muscle Use] : no accessory muscle use [Respiration, Rhythm And Depth] : normal respiratory rhythm and effort [Auscultation Breath Sounds / Voice Sounds] : lungs were clear to auscultation bilaterally [Normal S1, S2] : normal S1 and S2 [Murmurs] : no murmurs [Heart Rate And Rhythm] : heart rate was normal and rhythm regular [Heart Sounds Gallop] : no gallops [Abdomen Tenderness] : non-tender [No Masses] : no abdominal mass palpated [Abdomen Soft] : soft [Normal] : normal skin color and pigmentation [de-identified] : walks with walker - unsteady - holding on [de-identified] : trace LE edema [de-identified] : surgical scars [de-identified] : unsteady

## 2023-05-25 NOTE — DATA REVIEWED
[FreeTextEntry1] : reviewed labs from 10/22\par \par 5/25/23\par cervial myelopathy\par gait, incontinence\par suggestd fiber

## 2023-05-25 NOTE — ASSESSMENT
[FreeTextEntry1] : referred to endocrie\par h/o OP ad compression fx\par \par pt is no longer going out much bc of incontience

## 2023-05-25 NOTE — HISTORY OF PRESENT ILLNESS
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 diagnosis several drs said yes and no\par poor balance\par urine incontinence\par bowels ok\par \par has seen multiple neuro drs and neurosurgeons\par has had 33 MRIs of neck and shoulder\par \par 11 months ago\par reaching up to get food\par got compression fracture of L!\par \par enlarged prostate -\par prostate ca 13 years ago\par followed by urologist - Dr Shade Atwood\par \par last labs 10/203 - given to me for review\par \par has OP and gets Prolia\par \par recently stopped baby aspirin\par \par 5/25/23\par pt has op has had a compression fracture\par has had one prolia shot\par askig for dexa - but had one i bonnie in [facility]\par needs local endocirnologist\par has urinary incontinece and fecal icontinence looks like richard\par wears diapers\par went to GI this am\par

## 2023-07-26 ENCOUNTER — APPOINTMENT (OUTPATIENT)
Dept: GERIATRICS | Facility: HOME HEALTH | Age: 83
End: 2023-07-26
Payer: MEDICARE

## 2023-07-26 VITALS
SYSTOLIC BLOOD PRESSURE: 110 MMHG | WEIGHT: 174 LBS | TEMPERATURE: 97.2 F | DIASTOLIC BLOOD PRESSURE: 60 MMHG | OXYGEN SATURATION: 96 % | BODY MASS INDEX: 24.97 KG/M2 | HEART RATE: 78 BPM

## 2023-07-26 DIAGNOSIS — Z00.00 ENCOUNTER FOR GENERAL ADULT MEDICAL EXAMINATION W/OUT ABNORMAL FINDINGS: ICD-10-CM

## 2023-07-26 PROCEDURE — 99349 HOME/RES VST EST MOD MDM 40: CPT

## 2023-07-26 RX ORDER — GABAPENTIN 300 MG/1
300 CAPSULE ORAL
Qty: 90 | Refills: 3 | Status: DISCONTINUED | COMMUNITY
Start: 2023-03-15 | End: 2023-07-26

## 2023-07-26 RX ORDER — TADALAFIL 5 MG/1
5 TABLET ORAL
Qty: 30 | Refills: 5 | Status: DISCONTINUED | COMMUNITY
Start: 2023-03-15 | End: 2023-07-26

## 2023-07-26 RX ORDER — TAMSULOSIN HYDROCHLORIDE 0.4 MG/1
0.4 CAPSULE ORAL
Qty: 90 | Refills: 3 | Status: DISCONTINUED | COMMUNITY
Start: 2023-07-26 | End: 2023-07-26

## 2023-07-26 NOTE — PHYSICAL EXAM
[No Respiratory Distress] : no respiratory distress [No Acc Muscle Use] : no accessory muscle use [Respiration, Rhythm And Depth] : normal respiratory rhythm and effort [Auscultation Breath Sounds / Voice Sounds] : lungs were clear to auscultation bilaterally [Normal S1, S2] : normal S1 and S2 [Murmurs] : no murmurs [Heart Rate And Rhythm] : heart rate was normal and rhythm regular [Heart Sounds Gallop] : no gallops [Abdomen Tenderness] : non-tender [No Masses] : no abdominal mass palpated [Abdomen Soft] : soft [Normal] : normal skin color and pigmentation [de-identified] : walks with walker - unsteady - holding on [de-identified] : rt ribs point tenderness anteriorly [de-identified] : trace LE edema [de-identified] : surgical scars [de-identified] : unsteady

## 2023-07-26 NOTE — HISTORY OF PRESENT ILLNESS
[FreeTextEntry1] : pt is 82 year old man - \par here with wife with advanced AD and aide myles\par seen at home\par \par at age 79\par very athletic\par 30 years martial arts - \par 3rd degree \par swam across [place]\par \par 3 years ago - fell and fractured humerus\par got hip  replacement 2/2019\par repaired rectus tendon  groin -  caused a lot of complicated repair\par had three cervical and 4 lumbar discs repaired\par then went to [facility]\par \par cervical myelopathy ? diagnosis several drs said yes and no\par poor balance\par urine incontinence\par bowels ok\par \par has seen multiple neuro drs and neurosurgeons\par has had 33 MRIs of neck and shoulder\par \par 11 months ago\par reaching up to get food\par got compression fracture of L!\par \par enlarged prostate -\par prostate ca 13 years ago\par followed by urologist - Dr Shade Atwood\par \par last labs 10/203 - given to me for review\par \par has OP and gets Prolia\par \par recently stopped baby aspirin\par \par 5/25/23\par pt has op has had a compression fracture\par has had one prolia shot\par askig for dexa - but had one i [facility] in [facility]\par needs local endocirnologist\par has urinary incontinece and fecal icontinence looks like richard\par wears diapers\par went to GI this am\par \par 7/26/23\par pt was admitted thursday to sunday with appendiciits\par right side hurts\par since\par was confused in hospital\par son is here from [place] [facility]\par on cefuroxime and metronidazole\par tamsulosin .4 mg\par \par \par alternating gentessa and myrmyrtyq rotating q 2 weeks

## 2023-07-26 NOTE — ASSESSMENT
[FreeTextEntry1] : referred to endocrie\par h/o OP ad compression fx\par \par pt is no longer going out much bc of incontience\par \par \par 7/26/23\par pt is s/p appendectomy\par pain over rt anterior ribs\par can use lidocaine\par tylenol , ativan\par \par has aide 9 hours \par \par will try to get home pt Kenneth\par

## 2023-07-26 NOTE — REVIEW OF SYSTEMS
[As Noted in HPI] : as noted in HPI [Incontinence] : incontinence [Limb Weakness] : limb weakness [Difficulty Walking] : difficulty walking [Negative] : Constitutional [FreeTextEntry2] : sl slurry speech [FreeTextEntry7] : movong bowels

## 2023-09-02 NOTE — PHYSICAL THERAPY INITIAL EVALUATION ADULT - LEVEL OF INDEPENDENCE: STAIR NEGOTIATION, REHAB EVAL
Const:  Alert and interactive, no acute distress  HEENT: Normocephalic, atraumatic; TMs WNL; Moist mucosa; Oropharynx clear; no tongue swelling; neck supple; no malocclusion, no dental luxation or gingival bleeding noted.   CV: Heart regular, normal S1/2, no murmurs; Extremities WWPx4  Pulm: Lungs clear to auscultation bilaterally  GI: Abdomen non-distended; No organomegaly, no tenderness, no masses  Skin: Faint red rash on arms, trunk and face, including above the upper lip.  Swelling of the upper lip.  Clear hives on the posterior aspect of her neck.  Neuro: Alert; Normal tone; coordination appropriate for age
to be assessed

## 2023-09-18 RX ORDER — FLUDROCORTISONE ACETATE 0.1 MG/1
0.1 TABLET ORAL
Qty: 90 | Refills: 3 | Status: ACTIVE | COMMUNITY
Start: 2023-03-15

## 2023-10-17 ENCOUNTER — APPOINTMENT (OUTPATIENT)
Dept: GERIATRICS | Facility: HOME HEALTH | Age: 83
End: 2023-10-17
Payer: MEDICARE

## 2023-10-17 DIAGNOSIS — Z23 ENCOUNTER FOR IMMUNIZATION: ICD-10-CM

## 2023-10-17 DIAGNOSIS — I95.9 HYPOTENSION, UNSPECIFIED: ICD-10-CM

## 2023-10-17 PROCEDURE — 99349 HOME/RES VST EST MOD MDM 40: CPT

## 2023-10-17 PROCEDURE — G0008: CPT

## 2023-10-17 PROCEDURE — 90662 IIV NO PRSV INCREASED AG IM: CPT

## 2023-10-18 PROBLEM — Z23 ENCOUNTER FOR IMMUNIZATION: Status: ACTIVE | Noted: 2023-10-18 | Resolved: 2023-11-01

## 2023-10-19 VITALS
DIASTOLIC BLOOD PRESSURE: 70 MMHG | OXYGEN SATURATION: 94 % | HEART RATE: 64 BPM | SYSTOLIC BLOOD PRESSURE: 100 MMHG | TEMPERATURE: 97.2 F

## 2023-10-19 PROBLEM — I95.9 HYPOTENSION: Status: ACTIVE | Noted: 2023-03-15

## 2024-01-30 ENCOUNTER — APPOINTMENT (OUTPATIENT)
Dept: GERIATRICS | Facility: HOME HEALTH | Age: 84
End: 2024-01-30
Payer: MEDICARE

## 2024-01-30 DIAGNOSIS — R47.1 DYSARTHRIA AND ANARTHRIA: ICD-10-CM

## 2024-01-30 PROCEDURE — 99349 HOME/RES VST EST MOD MDM 40: CPT

## 2024-02-01 VITALS — DIASTOLIC BLOOD PRESSURE: 80 MMHG | SYSTOLIC BLOOD PRESSURE: 120 MMHG | HEART RATE: 74 BPM | OXYGEN SATURATION: 97 %

## 2024-02-01 PROBLEM — R47.1 DYSARTHRIA ON EXAMINATION: Status: ACTIVE | Noted: 2024-02-01

## 2024-02-01 NOTE — REVIEW OF SYSTEMS
[Incontinence] : incontinence [Limb Weakness] : limb weakness [Difficulty Walking] : difficulty walking [Anxiety] : anxiety [Muscle Weakness] : muscle weakness [Negative] : Heme/Lymph [FreeTextEntry4] : dysarthria

## 2024-02-01 NOTE — ASSESSMENT
[FreeTextEntry1] : referred to endocrie h/o OP ad compression fx  pt is no longer going out much bc of incontience   7/26/23 pt is s/p appendectomy pain over rt anterior ribs can use lidocaine tylenol , ativan  has aide 9 hours   will try to get home pt Kenneth  1/30/24 pt with progressive cervical myloapthy referred to to [facility] for voice therapy appt arranged for 2/21

## 2024-02-01 NOTE — PHYSICAL EXAM
[No Respiratory Distress] : no respiratory distress [No Acc Muscle Use] : no accessory muscle use [Respiration, Rhythm And Depth] : normal respiratory rhythm and effort [Auscultation Breath Sounds / Voice Sounds] : lungs were clear to auscultation bilaterally [Normal S1, S2] : normal S1 and S2 [Murmurs] : no murmurs [Heart Rate And Rhythm] : heart rate was normal and rhythm regular [Heart Sounds Gallop] : no gallops [Abdomen Tenderness] : non-tender [No Masses] : no abdominal mass palpated [Abdomen Soft] : soft [Normal] : normal skin color and pigmentation [de-identified] : walks with walker - unsteady - holding on dysarthria [de-identified] : scar on cervical area from surgery [de-identified] : trace LE edema [de-identified] : surgical scars [de-identified] : unsteady
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
## 2024-05-08 ENCOUNTER — APPOINTMENT (OUTPATIENT)
Dept: GERIATRICS | Facility: HOME HEALTH | Age: 84
End: 2024-05-08
Payer: MEDICARE

## 2024-05-08 VITALS
HEART RATE: 78 BPM | OXYGEN SATURATION: 95 % | SYSTOLIC BLOOD PRESSURE: 110 MMHG | DIASTOLIC BLOOD PRESSURE: 70 MMHG | TEMPERATURE: 96.8 F

## 2024-05-08 DIAGNOSIS — G95.9 DISEASE OF SPINAL CORD, UNSPECIFIED: ICD-10-CM

## 2024-05-08 DIAGNOSIS — M79.2 NEURALGIA AND NEURITIS, UNSPECIFIED: ICD-10-CM

## 2024-05-08 DIAGNOSIS — R26.89 OTHER ABNORMALITIES OF GAIT AND MOBILITY: ICD-10-CM

## 2024-05-08 DIAGNOSIS — N32.81 OVERACTIVE BLADDER: ICD-10-CM

## 2024-05-08 PROCEDURE — 99349 HOME/RES VST EST MOD MDM 40: CPT

## 2024-05-08 RX ORDER — MIRABEGRON 50 MG/1
50 TABLET, FILM COATED, EXTENDED RELEASE ORAL
Qty: 90 | Refills: 3 | Status: DISCONTINUED | COMMUNITY
Start: 2023-07-26 | End: 2024-05-08

## 2024-05-08 RX ORDER — ASPIRIN 81 MG/1
81 TABLET, DELAYED RELEASE ORAL DAILY
Refills: 0 | Status: DISCONTINUED | COMMUNITY
Start: 2023-03-15 | End: 2024-05-08

## 2024-05-08 NOTE — PHYSICAL EXAM
[No Respiratory Distress] : no respiratory distress [No Acc Muscle Use] : no accessory muscle use [Respiration, Rhythm And Depth] : normal respiratory rhythm and effort [Auscultation Breath Sounds / Voice Sounds] : lungs were clear to auscultation bilaterally [Normal S1, S2] : normal S1 and S2 [Murmurs] : no murmurs [Heart Rate And Rhythm] : heart rate was normal and rhythm regular [Heart Sounds Gallop] : no gallops [Abdomen Tenderness] : non-tender [No Masses] : no abdominal mass palpated [Abdomen Soft] : soft [Normal] : normal skin color and pigmentation [de-identified] : in wheel chair -  dysarthria - alert [de-identified] : scar on cervical area from surgery [de-identified] : trace LE edema [de-identified] : surgical scars [de-identified] : unsteady
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
## 2024-05-08 NOTE — REVIEW OF SYSTEMS
[Cough] : cough [Incontinence] : incontinence [Limb Weakness] : limb weakness [Difficulty Walking] : difficulty walking [Negative] : Heme/Lymph

## 2024-05-08 NOTE — ASSESSMENT
[FreeTextEntry1] : referred to endocrie h/o OP ad compression fx  pt is no longer going out much bc of incontience   7/26/23 pt is s/p appendectomy pain over rt anterior ribs can use lidocaine tylenol , ativan  has aide 9 hours   will try to get home pt Kenneth   5/8/24 cervical myelopathy getting speech therapy trial of  flonase

## 2024-07-15 ENCOUNTER — LABORATORY RESULT (OUTPATIENT)
Age: 84
End: 2024-07-15

## 2024-07-16 ENCOUNTER — LABORATORY RESULT (OUTPATIENT)
Age: 84
End: 2024-07-16

## 2024-07-19 ENCOUNTER — LABORATORY RESULT (OUTPATIENT)
Age: 84
End: 2024-07-19

## 2024-07-25 ENCOUNTER — APPOINTMENT (OUTPATIENT)
Dept: GERIATRICS | Facility: HOME HEALTH | Age: 84
End: 2024-07-25
Payer: MEDICARE

## 2024-07-25 VITALS
HEART RATE: 72 BPM | OXYGEN SATURATION: 96 % | DIASTOLIC BLOOD PRESSURE: 65 MMHG | SYSTOLIC BLOOD PRESSURE: 100 MMHG | TEMPERATURE: 97.3 F

## 2024-07-25 DIAGNOSIS — M81.0 AGE-RELATED OSTEOPOROSIS W/OUT CURRENT PATHOLOGICAL FRACTURE: ICD-10-CM

## 2024-07-25 DIAGNOSIS — G95.9 DISEASE OF SPINAL CORD, UNSPECIFIED: ICD-10-CM

## 2024-07-25 DIAGNOSIS — N32.81 OVERACTIVE BLADDER: ICD-10-CM

## 2024-07-25 DIAGNOSIS — R47.1 DYSARTHRIA AND ANARTHRIA: ICD-10-CM

## 2024-07-25 DIAGNOSIS — R26.89 OTHER ABNORMALITIES OF GAIT AND MOBILITY: ICD-10-CM

## 2024-07-25 PROCEDURE — 99349 HOME/RES VST EST MOD MDM 40: CPT

## 2024-07-25 NOTE — REVIEW OF SYSTEMS
[Incontinence] : incontinence [Arthralgias] : arthralgias [Confused] : confusion [Limb Weakness] : limb weakness [Difficulty Walking] : difficulty walking [Anxiety] : anxiety [Negative] : Heme/Lymph [de-identified] : episodes of confusiob

## 2024-07-25 NOTE — ASSESSMENT
[FreeTextEntry1] : referred to endocrie h/o OP ad compression fx  pt is no longer going out much bc of incontience   7/26/23 pt is s/p appendectomy pain over rt anterior ribs can use lidocaine tylenol , ativan  has aide 9 hours   will try to get home pt Kenneth 7/25/24 pts labs all wnl has cervical myelopathy cont current meds copy of labs given to pt

## 2024-07-25 NOTE — PHYSICAL EXAM
[No Respiratory Distress] : no respiratory distress [No Acc Muscle Use] : no accessory muscle use [Respiration, Rhythm And Depth] : normal respiratory rhythm and effort [Auscultation Breath Sounds / Voice Sounds] : lungs were clear to auscultation bilaterally [Normal S1, S2] : normal S1 and S2 [Murmurs] : no murmurs [Heart Rate And Rhythm] : heart rate was normal and rhythm regular [Heart Sounds Gallop] : no gallops [Abdomen Tenderness] : non-tender [No Masses] : no abdominal mass palpated [Abdomen Soft] : soft [Normal] : normal skin color and pigmentation [Normal Affect] : the affect was normal [Normal Mood] : the mood was normal [de-identified] : in wheel chair -  dysarthria - alert [de-identified] : scar on cervical area from surgery [de-identified] : trace LE edema [de-identified] : surgical scars [de-identified] : unsteady
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
## 2024-09-21 NOTE — PRE-OP CHECKLIST - VERIFY SURGICAL SITE/SIDE WITH PATIENT
Problem: Discharge Planning  Goal: Discharge to home or other facility with appropriate resources  9/20/2024 2252 by Miles Ramesh RN  Outcome: Progressing  9/20/2024 1140 by Adarsh Lawler RN  Outcome: Progressing     Problem: Pain  Goal: Verbalizes/displays adequate comfort level or baseline comfort level  9/20/2024 2252 by Miles Ramesh RN  Outcome: Progressing  9/20/2024 1140 by Adarsh Lawler RN  Outcome: Progressing     Problem: Safety - Adult  Goal: Free from fall injury  9/20/2024 2252 by Miles Ramesh RN  Outcome: Progressing  9/20/2024 1140 by Adarsh Lawler RN  Outcome: Progressing     Problem: Nutrition Deficit:  Goal: Optimize nutritional status  9/20/2024 2252 by Miles Ramesh RN  Outcome: Progressing  9/20/2024 1140 by Adarsh Lawler RN  Outcome: Progressing  Flowsheets (Taken 9/20/2024 1056 by Amalia Funk, MS, RD, LD)  Nutrient intake appropriate for improving, restoring, or maintaining nutritional needs:   Assess nutritional status and recommend course of action   Monitor oral intake, labs, and treatment plans   Recommend appropriate diets, oral nutritional supplements, and vitamin/mineral supplements     Problem: ABCDS Injury Assessment  Goal: Absence of physical injury  9/20/2024 2252 by Miles Ramesh RN  Outcome: Progressing  9/20/2024 1140 by Adarsh Lawler RN  Outcome: Progressing     Problem: Skin/Tissue Integrity  Goal: Absence of new skin breakdown  Description: 1.  Monitor for areas of redness and/or skin breakdown  2.  Assess vascular access sites hourly  3.  Every 4-6 hours minimum:  Change oxygen saturation probe site  4.  Every 4-6 hours:  If on nasal continuous positive airway pressure, respiratory therapy assess nares and determine need for appliance change or resting period.  Outcome: Progressing      done

## 2024-10-09 ENCOUNTER — APPOINTMENT (OUTPATIENT)
Dept: GERIATRICS | Facility: HOME HEALTH | Age: 84
End: 2024-10-09

## 2024-10-16 ENCOUNTER — APPOINTMENT (OUTPATIENT)
Dept: GERIATRICS | Facility: HOME HEALTH | Age: 84
End: 2024-10-16
Payer: MEDICARE

## 2024-10-16 VITALS — HEART RATE: 81 BPM | SYSTOLIC BLOOD PRESSURE: 110 MMHG | OXYGEN SATURATION: 97 % | DIASTOLIC BLOOD PRESSURE: 80 MMHG

## 2024-10-16 DIAGNOSIS — R26.89 OTHER ABNORMALITIES OF GAIT AND MOBILITY: ICD-10-CM

## 2024-10-16 DIAGNOSIS — G95.9 DISEASE OF SPINAL CORD, UNSPECIFIED: ICD-10-CM

## 2024-10-16 DIAGNOSIS — R47.1 DYSARTHRIA AND ANARTHRIA: ICD-10-CM

## 2024-10-16 DIAGNOSIS — N32.81 OVERACTIVE BLADDER: ICD-10-CM

## 2024-10-16 DIAGNOSIS — I95.9 HYPOTENSION, UNSPECIFIED: ICD-10-CM

## 2024-10-16 DIAGNOSIS — E78.00 PURE HYPERCHOLESTEROLEMIA, UNSPECIFIED: ICD-10-CM

## 2024-10-16 DIAGNOSIS — M79.2 NEURALGIA AND NEURITIS, UNSPECIFIED: ICD-10-CM

## 2024-10-16 DIAGNOSIS — M81.0 AGE-RELATED OSTEOPOROSIS W/OUT CURRENT PATHOLOGICAL FRACTURE: ICD-10-CM

## 2024-10-16 PROCEDURE — 99349 HOME/RES VST EST MOD MDM 40: CPT
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
## 2024-10-16 NOTE — ASSESSMENT
[FreeTextEntry1] : referred to endocrie h/o OP ad compression fx  pt is no longer going out much bc of incontience   7/26/23 pt is s/p appendectomy pain over rt anterior ribs can use lidocaine tylenol , ativan  has aide 9 hours   will try to get home pt Fernando 7/25/24 pts labs all wnl has cervical myelopathy cont current meds copy of labs given to pt  10/16/24 pt is stable could not remember vaccine status [facility] has not had recent vaccines needs rsv and covid I gave him flu vaccine today

## 2024-10-16 NOTE — PHYSICAL EXAM
[No Respiratory Distress] : no respiratory distress [No Acc Muscle Use] : no accessory muscle use [Respiration, Rhythm And Depth] : normal respiratory rhythm and effort [Auscultation Breath Sounds / Voice Sounds] : lungs were clear to auscultation bilaterally [Normal S1, S2] : normal S1 and S2 [Murmurs] : no murmurs [Heart Rate And Rhythm] : heart rate was normal and rhythm regular [Heart Sounds Gallop] : no gallops [Abdomen Tenderness] : non-tender [No Masses] : no abdominal mass palpated [Abdomen Soft] : soft [Normal] : normal skin color and pigmentation [Normal Affect] : the affect was normal [Normal Mood] : the mood was normal [de-identified] : in wheel chair -  dysarthria - alert [de-identified] : scar on cervical area from surgery [de-identified] : trace LE edema [de-identified] : surgical scars [de-identified] : unsteady

## 2024-10-16 NOTE — REVIEW OF SYSTEMS
[Feeling Poorly] : feeling poorly [Wheezing] : wheezing [Incontinence] : incontinence [Joint Stiffness] : joint stiffness [Confused] : confusion [Limb Weakness] : limb weakness [Difficulty Walking] : difficulty walking [Anxiety] : anxiety [Negative] : Endocrine [FreeTextEntry7] : had diarrhea after eating a lot of steak

## 2024-11-08 DIAGNOSIS — R06.2 WHEEZING: ICD-10-CM

## 2024-11-08 RX ORDER — BLOOD-GLUCOSE METER
KIT MISCELLANEOUS
Qty: 1 | Refills: 0 | Status: ACTIVE | COMMUNITY
Start: 2024-11-08 | End: 1900-01-01

## 2024-11-08 RX ORDER — ALBUTEROL SULFATE 2.5 MG/3ML
(2.5 MG/3ML) SOLUTION RESPIRATORY (INHALATION)
Qty: 3 | Refills: 5 | Status: ACTIVE | COMMUNITY
Start: 2024-11-08 | End: 1900-01-01

## 2024-11-22 NOTE — PHYSICAL THERAPY INITIAL EVALUATION ADULT - TRANSFER SKILLS, REHAB EVAL
Detail Level: Detailed Add 23863 Cpt? (Important Note: In 2017 The Use Of 52177 Is Being Tracked By Cms To Determine Future Global Period Reimbursement For Global Periods): yes independent

## 2024-11-25 ENCOUNTER — APPOINTMENT (OUTPATIENT)
Dept: GERIATRICS | Facility: HOME HEALTH | Age: 84
End: 2024-11-25
Payer: MEDICARE

## 2024-11-25 VITALS
HEART RATE: 87 BPM | OXYGEN SATURATION: 96 % | DIASTOLIC BLOOD PRESSURE: 70 MMHG | TEMPERATURE: 98.1 F | SYSTOLIC BLOOD PRESSURE: 110 MMHG

## 2024-11-25 DIAGNOSIS — N32.81 OVERACTIVE BLADDER: ICD-10-CM

## 2024-11-25 DIAGNOSIS — G95.9 DISEASE OF SPINAL CORD, UNSPECIFIED: ICD-10-CM

## 2024-11-25 DIAGNOSIS — R47.1 DYSARTHRIA AND ANARTHRIA: ICD-10-CM

## 2024-11-25 DIAGNOSIS — R06.2 WHEEZING: ICD-10-CM

## 2024-11-25 DIAGNOSIS — R26.89 OTHER ABNORMALITIES OF GAIT AND MOBILITY: ICD-10-CM

## 2024-11-25 PROCEDURE — 99349 HOME/RES VST EST MOD MDM 40: CPT

## 2024-11-25 NOTE — ASSESSMENT
[FreeTextEntry1] : referred to endocrie h/o OP ad compression fx  pt is no longer going out much bc of incontience   7/26/23 pt is s/p appendectomy pain over rt anterior ribs can use lidocaine tylenol , ativan  has aide 9 hours   will try to get home pt Fernando 7/25/24 pts labs all wnl has cervical myelopathy cont current meds copy of labs given to pt  10/16/24 pt is stable could not remember vaccine status [facility] not had recent vaccines needs rsv and covid I gave him flu vaccine today   11/25/24 pt with new onset wheezing when lying down albuterol not significantly helping suggested propping head up - - bed with head elevated will get chest xray no fever, no cough may have sleep apnea - would be very hard to evaluate as home bound stressed with wife on hospice

## 2024-11-25 NOTE — PHYSICAL EXAM
[No Respiratory Distress] : no respiratory distress [No Acc Muscle Use] : no accessory muscle use [Respiration, Rhythm And Depth] : normal respiratory rhythm and effort [Auscultation Breath Sounds / Voice Sounds] : lungs were clear to auscultation bilaterally [Normal S1, S2] : normal S1 and S2 [Murmurs] : no murmurs [Heart Rate And Rhythm] : heart rate was normal and rhythm regular [Heart Sounds Gallop] : no gallops [Abdomen Tenderness] : non-tender [No Masses] : no abdominal mass palpated [Abdomen Soft] : soft [Normal] : normal affect and normal mood [Normal Affect] : the affect was normal [Normal Mood] : the mood was normal [de-identified] : in wheel chair -  dysarthria - alert [de-identified] : scar on cervical area from surgery [de-identified] : sitting - no wheezing, lying - faint diffuse expiratory wheezes are heard [de-identified] : trace LE edema, errythema - dependent [de-identified] : surgical scars [de-identified] : unsteady [de-identified] : weak [de-identified] : anxious

## 2024-11-25 NOTE — REVIEW OF SYSTEMS
[Recent Weight Loss (___ Lbs)] : recent [unfilled] ~Ulb weight loss [Wheezing] : wheezing [Incontinence] : incontinence [Limb Weakness] : limb weakness [Difficulty Walking] : difficulty walking [Anxiety] : anxiety [Muscle Weakness] : muscle weakness [Negative] : Heme/Lymph [Fever] : no fever [Chills] : no chills [Chest Pain] : no chest pain [Palpitations] : no palpitations [FreeTextEntry2] : unable to stand on scale - but visible weight loss [FreeTextEntry6] : on lying [de-identified] : mildly confused at times -
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
## 2024-12-19 ENCOUNTER — APPOINTMENT (OUTPATIENT)
Dept: GERIATRICS | Facility: HOME HEALTH | Age: 84
End: 2024-12-19
Payer: MEDICARE

## 2024-12-19 VITALS
DIASTOLIC BLOOD PRESSURE: 80 MMHG | HEART RATE: 78 BPM | TEMPERATURE: 97.8 F | SYSTOLIC BLOOD PRESSURE: 115 MMHG | OXYGEN SATURATION: 98 %

## 2024-12-19 DIAGNOSIS — K21.9 GASTRO-ESOPHAGEAL REFLUX DISEASE W/OUT ESOPHAGITIS: ICD-10-CM

## 2024-12-19 DIAGNOSIS — N32.81 OVERACTIVE BLADDER: ICD-10-CM

## 2024-12-19 DIAGNOSIS — I95.9 HYPOTENSION, UNSPECIFIED: ICD-10-CM

## 2024-12-19 DIAGNOSIS — R06.2 WHEEZING: ICD-10-CM

## 2024-12-19 DIAGNOSIS — R26.89 OTHER ABNORMALITIES OF GAIT AND MOBILITY: ICD-10-CM

## 2024-12-19 DIAGNOSIS — G95.9 DISEASE OF SPINAL CORD, UNSPECIFIED: ICD-10-CM

## 2024-12-19 DIAGNOSIS — M81.0 AGE-RELATED OSTEOPOROSIS W/OUT CURRENT PATHOLOGICAL FRACTURE: ICD-10-CM

## 2024-12-19 DIAGNOSIS — M25.511 PAIN IN RIGHT SHOULDER: ICD-10-CM

## 2024-12-19 DIAGNOSIS — G89.29 PAIN IN RIGHT SHOULDER: ICD-10-CM

## 2024-12-19 PROCEDURE — 99349 HOME/RES VST EST MOD MDM 40: CPT

## 2024-12-19 RX ORDER — FLUTICASONE PROPIONATE AND SALMETEROL 250; 50 UG/1; UG/1
250-50 POWDER RESPIRATORY (INHALATION)
Qty: 1 | Refills: 5 | Status: ACTIVE | COMMUNITY
Start: 2024-12-19 | End: 1900-01-01

## 2024-12-19 RX ORDER — ESOMEPRAZOLE MAGNESIUM 20 MG/1
20 CAPSULE, DELAYED RELEASE ORAL DAILY
Qty: 90 | Refills: 3 | Status: ACTIVE | COMMUNITY
Start: 2024-12-19 | End: 1900-01-01
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
## 2024-12-19 NOTE — REVIEW OF SYSTEMS
[Feeling Tired] : feeling tired [Recent Weight Loss (___ Lbs)] : recent [unfilled] ~Ulb weight loss [Shortness Of Breath] : shortness of breath [Wheezing] : wheezing [Incontinence] : incontinence [Joint Stiffness] : joint stiffness [Limb Weakness] : limb weakness [Difficulty Walking] : difficulty walking [Sleep Disturbances] : sleep disturbances [Anxiety] : anxiety [Muscle Weakness] : muscle weakness [Negative] : Heme/Lymph [FreeTextEntry2] : sleeps 9 pm to 4 am [FreeTextEntry7] : noises from stomach [FreeTextEntry9] : rt shoulder

## 2024-12-19 NOTE — PHYSICAL EXAM
[No Respiratory Distress] : no respiratory distress [No Acc Muscle Use] : no accessory muscle use [Respiration, Rhythm And Depth] : normal respiratory rhythm and effort [Auscultation Breath Sounds / Voice Sounds] : lungs were clear to auscultation bilaterally [Normal S1, S2] : normal S1 and S2 [Murmurs] : no murmurs [Heart Rate And Rhythm] : heart rate was normal and rhythm regular [Heart Sounds Gallop] : no gallops [Bowel Sounds] : normal bowel sounds [Abdomen Tenderness] : non-tender [No Masses] : no abdominal mass palpated [Abdomen Soft] : soft [Normal] : normal affect and normal mood [Normal Affect] : the affect was normal [Normal Mood] : the mood was normal [de-identified] : in wheel chair -  dysarthria - alert [de-identified] : scar on cervical area from surgery [de-identified] : sitting - no wheezing, lying - faint diffuse expiratory wheezes are heard [de-identified] : trace LE edema, errythema - dependent [de-identified] : surgical scars [de-identified] : unsteady [de-identified] : weak [de-identified] : anxious

## 2024-12-19 NOTE — ASSESSMENT
[FreeTextEntry1] : referred to endocrie h/o OP ad compression fx  pt is no longer going out much bc of incontience   7/26/23 pt is s/p appendectomy pain over rt anterior ribs can use lidocaine tylenol , ativan  has aide 9 hours   will try to get home pt Fernando 7/25/24 pts labs all wnl has cervical myelopathy cont current meds copy of labs given to pt  10/16/24 pt is stable could not remember vaccine status called [facility] has not had recent vaccines needs rsv and covid I gave him flu vaccine today   11/25/24 pt with new onset wheezing when lying down albuterol not significantly helping suggested propping head up - - bed with head elevated will get chest xray no fever, no cough may have sleep apnea - would be very hard to evaluate as home bound stressed with wife on hospice  12/19/24 will try advair for wheezing using albuterol rt shoulder pain - suggested ortho and steroid injection - relucatant to go home PT requested abd discomforft - called in nexium

## 2025-01-29 ENCOUNTER — APPOINTMENT (OUTPATIENT)
Dept: GERIATRICS | Facility: HOME HEALTH | Age: 85
End: 2025-01-29

## 2025-05-12 ENCOUNTER — APPOINTMENT (OUTPATIENT)
Dept: ORTHOPEDIC SURGERY | Facility: CLINIC | Age: 85
End: 2025-05-12
Payer: MEDICARE

## 2025-05-12 VITALS
HEART RATE: 88 BPM | SYSTOLIC BLOOD PRESSURE: 117 MMHG | HEIGHT: 70 IN | DIASTOLIC BLOOD PRESSURE: 89 MMHG | BODY MASS INDEX: 23.91 KG/M2 | WEIGHT: 167 LBS | TEMPERATURE: 98.9 F | RESPIRATION RATE: 18 BRPM | OXYGEN SATURATION: 99 %

## 2025-05-12 DIAGNOSIS — S43.421A SPRAIN OF RIGHT ROTATOR CUFF CAPSULE, INITIAL ENCOUNTER: ICD-10-CM

## 2025-05-12 DIAGNOSIS — M19.011 PRIMARY OSTEOARTHRITIS, RIGHT SHOULDER: ICD-10-CM

## 2025-05-12 PROCEDURE — 99204 OFFICE O/P NEW MOD 45 MIN: CPT

## 2025-05-12 PROCEDURE — 73030 X-RAY EXAM OF SHOULDER: CPT | Mod: RT

## 2025-05-12 PROCEDURE — G2211 COMPLEX E/M VISIT ADD ON: CPT

## 2025-05-12 NOTE — HISTORY OF PRESENT ILLNESS
[de-identified] : Very pleasant 84-year-old right-hand-dominant gentleman comes in with right shoulder discomfort.  This been going on for some time.  He has steroid injection back on February 18 that helped him a little bit.  Currently main complaint is pain and discomfort and loss of motion of his right shoulder.  No recent trauma.  Feels like the pain is getting worse.

## 2025-05-12 NOTE — PHYSICAL EXAM
[de-identified] : Well appearing person in no acute distress. Appears their stated age. Alert and oriented x3. Normal mood and affect. They have good range of motion of their shoulder today with the exception that they are lacking about 50 degrees of forward flexion and abduction. They have tenderness over their biceps. Positive [place] test. Positive throwing test. Positive Speed's test. Positive Slaughter test. They have some weakness on strength testing on their supraspinatus with discomfort on testing of their supraspinatus. They have no tenderness over their AC joint. No pain with range of motion of their neck or elbow. Neurovascularly intact in their upper extremity. [ ]  [de-identified] : 4 views right shoulder ordered and reviewed.  These demonstrate degenerative changes present the shoulder.  Alignment is well-maintained.  No other acute abnormalities are seen

## 2025-05-12 NOTE — DISCUSSION/SUMMARY
[de-identified] : Had a long discussion with the patient.  We discussed operative and nonoperative treatment options.  I explained him we can certainly consider repeat steroid injection which would be my preference.  We have to wait another month as he had one done 3 months ago.  I will see him back in the office in a month for repeat steroid injection.  We did discuss that if this does not help him we can certainly consider a course of viscosupplementation versus a PRP injection but I did explain him those would be out-of-pocket expenses so we can get him into a good place with the steroid injections we should stick to that.  If these ceased to help him then we can certainly consider right reverse total shoulder arthroplasty but given his age and activity level I would like to avoid this if at all possible.  He was amenable to this.  We also discussed use of anti-inflammatory medications as well as the risks and benefits.  All of his questions were answered, he agrees with the plan the risks and benefits of the procedure were explained to the patient at great length which include, but are not limited to, bleeding, infection, blood clots, permanent or transient neurapraxia, need for further surgery, stiffness, failure, continued pain, continued mechanical symptoms, non-union, malunion, need for hardware removal, and death.

## 2025-07-06 NOTE — REASON FOR VISIT
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

## 2025-07-07 ENCOUNTER — APPOINTMENT (OUTPATIENT)
Dept: ORTHOPEDIC SURGERY | Facility: CLINIC | Age: 85
End: 2025-07-07